# Patient Record
Sex: FEMALE | Race: WHITE | NOT HISPANIC OR LATINO | Employment: FULL TIME | URBAN - METROPOLITAN AREA
[De-identification: names, ages, dates, MRNs, and addresses within clinical notes are randomized per-mention and may not be internally consistent; named-entity substitution may affect disease eponyms.]

---

## 2018-11-14 ENCOUNTER — OFFICE VISIT (OUTPATIENT)
Dept: FAMILY MEDICINE | Facility: CLINIC | Age: 48
End: 2018-11-14
Payer: COMMERCIAL

## 2018-11-14 VITALS
HEART RATE: 77 BPM | DIASTOLIC BLOOD PRESSURE: 70 MMHG | SYSTOLIC BLOOD PRESSURE: 120 MMHG | WEIGHT: 185 LBS | RESPIRATION RATE: 16 BRPM | TEMPERATURE: 98.3 F | HEIGHT: 63 IN | OXYGEN SATURATION: 98 % | BODY MASS INDEX: 32.78 KG/M2

## 2018-11-14 DIAGNOSIS — H01.001 BLEPHARITIS OF UPPER EYELIDS OF BOTH EYES, UNSPECIFIED TYPE: ICD-10-CM

## 2018-11-14 DIAGNOSIS — Z00.00 ROUTINE PHYSICAL EXAMINATION: Primary | ICD-10-CM

## 2018-11-14 DIAGNOSIS — J32.0 CHRONIC MAXILLARY SINUSITIS: ICD-10-CM

## 2018-11-14 DIAGNOSIS — H01.004 BLEPHARITIS OF UPPER EYELIDS OF BOTH EYES, UNSPECIFIED TYPE: ICD-10-CM

## 2018-11-14 PROCEDURE — 99396 PREV VISIT EST AGE 40-64: CPT | Performed by: FAMILY MEDICINE

## 2018-11-14 RX ORDER — CETIRIZINE HYDROCHLORIDE 1 MG/ML
SOLUTION ORAL
COMMUNITY
End: 2018-11-14 | Stop reason: ENTERED-IN-ERROR

## 2018-11-14 RX ORDER — MOMETASONE FUROATE MONOHYDRATE 50 UG/1
SPRAY, METERED NASAL
COMMUNITY
Start: 2012-09-04 | End: 2018-11-14 | Stop reason: ENTERED-IN-ERROR

## 2018-11-14 RX ORDER — MULTIVITAMIN
1 TABLET ORAL DAILY
COMMUNITY
End: 2021-07-30

## 2018-11-14 RX ORDER — CETIRIZINE HYDROCHLORIDE 10 MG/1
10 TABLET ORAL AS NEEDED
COMMUNITY

## 2018-11-14 RX ORDER — MONTELUKAST SODIUM 10 MG/1
10 TABLET ORAL
COMMUNITY
Start: 2012-09-04 | End: 2018-11-14 | Stop reason: ENTERED-IN-ERROR

## 2018-11-14 RX ORDER — DOXYCYCLINE HYCLATE 100 MG
100 TABLET ORAL 2 TIMES DAILY
Qty: 20 TABLET | Refills: 0 | Status: SHIPPED | OUTPATIENT
Start: 2018-11-14 | End: 2018-11-24

## 2018-11-14 RX ORDER — ERYTHROMYCIN 5 MG/G
OINTMENT OPHTHALMIC NIGHTLY
Qty: 3.5 G | Refills: 0 | Status: SHIPPED | OUTPATIENT
Start: 2018-11-14 | End: 2018-11-28

## 2018-11-14 ASSESSMENT — ENCOUNTER SYMPTOMS
PALPITATIONS: 0
SINUS PRESSURE: 1
ARTHRALGIAS: 0
DIARRHEA: 0
NERVOUS/ANXIOUS: 0
HEMATURIA: 0
FATIGUE: 0
EYE PAIN: 0
SINUS COMPLAINT: 1
CHILLS: 0
CONSTIPATION: 0
COUGH: 0
FEVER: 0
CHEST TIGHTNESS: 0
DIZZINESS: 0
RHINORRHEA: 1
WHEEZING: 0
ABDOMINAL PAIN: 0
HEADACHES: 0
FREQUENCY: 0
SORE THROAT: 0
BRUISES/BLEEDS EASILY: 0
MYALGIAS: 0
SHORTNESS OF BREATH: 0
WEAKNESS: 0
SINUS PAIN: 1

## 2019-02-19 ENCOUNTER — TELEPHONE (OUTPATIENT)
Dept: FAMILY MEDICINE | Facility: CLINIC | Age: 49
End: 2019-02-19

## 2019-02-19 DIAGNOSIS — R31.9 HEMATURIA, UNSPECIFIED TYPE: Primary | ICD-10-CM

## 2019-02-19 LAB
ALBUMIN SERPL-MCNC: 4.3 G/DL (ref 3.5–5.5)
ALBUMIN/GLOB SERPL: 2.3 {RATIO} (ref 1.2–2.2)
ALP SERPL-CCNC: 51 IU/L (ref 39–117)
ALT SERPL-CCNC: 14 IU/L (ref 0–32)
APPEARANCE UR: CLEAR
AST SERPL-CCNC: 18 IU/L (ref 0–40)
BACTERIA #/AREA URNS HPF: ABNORMAL /[HPF]
BILIRUB SERPL-MCNC: 0.3 MG/DL (ref 0–1.2)
BILIRUB UR QL STRIP: NEGATIVE
BUN SERPL-MCNC: 11 MG/DL (ref 6–24)
BUN/CREAT SERPL: 15 (ref 9–23)
CALCIUM SERPL-MCNC: 8.9 MG/DL (ref 8.7–10.2)
CHLORIDE SERPL-SCNC: 105 MMOL/L (ref 96–106)
CHOLEST SERPL-MCNC: 163 MG/DL (ref 100–199)
CO2 SERPL-SCNC: 24 MMOL/L (ref 20–29)
COLOR UR: YELLOW
CREAT SERPL-MCNC: 0.72 MG/DL (ref 0.57–1)
EPI CELLS #/AREA URNS HPF: ABNORMAL /HPF
ERYTHROCYTE [DISTWIDTH] IN BLOOD BY AUTOMATED COUNT: 13.7 % (ref 12.3–15.4)
GLOBULIN SER CALC-MCNC: 1.9 G/DL (ref 1.5–4.5)
GLUCOSE SERPL-MCNC: 87 MG/DL (ref 65–99)
GLUCOSE UR QL: NEGATIVE
HCT VFR BLD AUTO: 39.2 % (ref 34–46.6)
HDLC SERPL-MCNC: 68 MG/DL
HGB BLD-MCNC: 12.6 G/DL (ref 11.1–15.9)
HGB UR QL STRIP: (no result)
KETONES UR QL STRIP: NEGATIVE
LAB CORP EGFR IF AFRICN AM: 115 ML/MIN/1.73
LAB CORP EGFR IF NONAFRICN AM: 99 ML/MIN/1.73
LAB CORP URINALYSIS REFLEX: (no result)
LDLC SERPL CALC-MCNC: 84 MG/DL (ref 0–99)
LEUKOCYTE ESTERASE UR QL STRIP: NEGATIVE
MCH RBC QN AUTO: 28.9 PG (ref 26.6–33)
MCHC RBC AUTO-ENTMCNC: 32.1 G/DL (ref 31.5–35.7)
MCV RBC AUTO: 90 FL (ref 79–97)
MICRO URNS: (no result)
MUCOUS THREADS URNS QL MICRO: PRESENT
NITRITE UR QL STRIP: NEGATIVE
PH UR STRIP: 7.5 [PH] (ref 5–7.5)
PLATELET # BLD AUTO: 325 X10E3/UL (ref 150–379)
POTASSIUM SERPL-SCNC: 4.5 MMOL/L (ref 3.5–5.2)
PROT SERPL-MCNC: 6.2 G/DL (ref 6–8.5)
PROT UR QL STRIP: NEGATIVE
RBC # BLD AUTO: 4.36 X10E6/UL (ref 3.77–5.28)
RBC #/AREA URNS HPF: >30 /HPF
SODIUM SERPL-SCNC: 141 MMOL/L (ref 134–144)
SP GR UR: 1.02 (ref 1–1.03)
TRIGL SERPL-MCNC: 53 MG/DL (ref 0–149)
TSH SERPL DL<=0.005 MIU/L-ACNC: 2.18 UIU/ML (ref 0.45–4.5)
UROBILINOGEN UR STRIP-MCNC: 1 EU/DL (ref 0.2–1)
VLDLC SERPL CALC-MCNC: 11 MG/DL (ref 5–40)
WBC # BLD AUTO: 6.4 X10E3/UL (ref 3.4–10.8)
WBC #/AREA URNS HPF: ABNORMAL /HPF

## 2019-02-21 ENCOUNTER — TELEPHONE (OUTPATIENT)
Dept: FAMILY MEDICINE | Facility: CLINIC | Age: 49
End: 2019-02-21

## 2019-02-21 NOTE — TELEPHONE ENCOUNTER
[facility] labs look good overall except urine showing blood and make sure she wasn't on her period and would like to repeat the urine in 4 wks to see if it clears up otherwise rest of labs ok for 1yr          ----- Message from Kenia Dumont, DO sent at 2/19/2019  9:42 PM EST -----  Please let her know labs look good overall except urine showing blood and make sure she wasn't on her period and would like to repeat the urine in 4 wks to see if it clears up otherwise rest of labs ok for 1yr

## 2019-03-18 NOTE — PROGRESS NOTES
Subjective      Patient ID: Karey Kaur is a 48 y.o. female.    She is here for Pe. Pt has been doing well overall. She is trying to follow well balanced meals. Stays well hydrated. She is walking 30min 5x wk. Average 6-7hrs of sleep a night. Normal BMs (usually is constipated) and urination. Normal menstrual cycles. UTD with gyn care. Due for eye exam. Due for fasting labs. UTD with immunizations. She saw derm last wk for eyelid swollen and puffy and tried elidel and tried hot compresses and was using baby shampoo and not getting better and has seen eye dr and didn't help her. Also having sinus issues for past few 4wks and not getitng better and yellow productive sputum as well along with ear pain.       Sinus Problem   Associated symptoms include ear pain and sinus pressure. Pertinent negatives include no chills, coughing, headaches, shortness of breath or sore throat.       The following have been reviewed and updated as appropriate in this visit:  Tobacco  Allergies  Meds  Problems  Med Hx  Surg Hx  Fam Hx       Review of Systems   Constitutional: Negative for chills, fatigue and fever.   HENT: Positive for ear pain, postnasal drip, rhinorrhea, sinus pain and sinus pressure. Negative for sore throat.    Eyes: Negative for pain and visual disturbance.        Upper eyelid swelling   Respiratory: Negative for cough, chest tightness, shortness of breath and wheezing.    Cardiovascular: Negative for chest pain and palpitations.   Gastrointestinal: Negative for abdominal pain, constipation and diarrhea.   Genitourinary: Negative for decreased urine volume, frequency and hematuria.   Musculoskeletal: Negative for arthralgias and myalgias.   Skin: Negative for rash.   Neurological: Negative for dizziness, weakness and headaches.   Hematological: Does not bruise/bleed easily.   Psychiatric/Behavioral: Negative for behavioral problems. The patient is not nervous/anxious.        Current Outpatient  "Prescriptions   Medication Sig Dispense Refill   • cetirizine (ZyrTEC) 10 mg tablet Take 10 mg by mouth daily.     • multivitamin (multivitamin) tablet Take 1 tablet by mouth daily.     • doxycycline (VIBRA-TABS) 100 mg tablet Take 1 tablet (100 mg total) by mouth 2 (two) times a day for 10 days. 20 tablet 0   • erythromycin (ILOTYCIN) 5 mg/gram (0.5 %) ophthalmic ointment Apply to both eyes nightly for 14 days. 3.5 g 0     No current facility-administered medications for this visit.      History reviewed. No pertinent past medical history.  Family History   Problem Relation Age of Onset   • Diabetes Mother    • Hypertension Mother    • Heart disease Father    • Hyperlipidemia Father    • Hypertension Father    • Breast cancer Maternal Grandmother      Past Surgical History:   Procedure Laterality Date   • ANKLE SURGERY     •  SECTION     • CHOLECYSTECTOMY     • LASIK     • LITHOTRIPSY     • MYRINGOTOMY W/ TUBES     • TONSILLECTOMY     • TUBAL LIGATION       Social History     Social History   • Marital status:      Spouse name: N/A   • Number of children: N/A   • Years of education: N/A     Occupational History   • Not on file.     Social History Main Topics   • Smoking status: Never Smoker   • Smokeless tobacco: Never Used   • Alcohol use Yes      Comment: Social   • Drug use: No   • Sexual activity: Not on file     Other Topics Concern   • Not on file     Social History Narrative   • No narrative on file     Allergies   Allergen Reactions   • Erythromycin GI intolerance   • Levofloxacin Other (see comments)   • Adhesive Rash   • Latex Rash   • Penicillins Rash   • Sulfa (Sulfonamide Antibiotics) Rash       Objective   /70 (BP Location: Right upper arm, Patient Position: Sitting)   Pulse 77   Temp 36.8 °C (98.3 °F) (Oral)   Resp 16   Ht 1.6 m (5' 2.99\")   Wt 83.9 kg (185 lb)   LMP 2018   SpO2 98%   BMI 32.78 kg/m²   Physical Exam   Constitutional: She is oriented to person, " place, and time. She appears well-developed and well-nourished.   HENT:   Head: Normocephalic and atraumatic.   Right Ear: External ear normal.   Left Ear: External ear normal.   B/L swollen turbinates, deviated septum, mild tender to maxillary sinuses, upper eyelids no swelling or discharge   Eyes: Conjunctivae are normal. Pupils are equal, round, and reactive to light.   Neck: Normal range of motion. Neck supple. No thyromegaly present.   Cardiovascular: Normal rate, regular rhythm and normal heart sounds.    No murmur heard.  Pulmonary/Chest: Effort normal and breath sounds normal. She has no wheezes.   Abdominal: Soft. Bowel sounds are normal. There is no tenderness.   Musculoskeletal: Normal range of motion. She exhibits no tenderness.   Lymphadenopathy:     She has no cervical adenopathy.   Neurological: She is alert and oriented to person, place, and time. No cranial nerve deficit.   Skin: Skin is warm and dry. No rash noted.   Psychiatric: She has a normal mood and affect.   Nursing note and vitals reviewed.      Assessment/Plan   Problem List Items Addressed This Visit     None      Visit Diagnoses     Routine physical examination    -  Primary    Relevant Orders    Comprehensive metabolic panel    TSH 3rd Generation    CBC    Lipid panel    Urinalysis with Reflex Culture    Chronic maxillary sinusitis        Given over 4wks and not getitng better will cover with abx and cont her supportive care.     Relevant Medications    doxycycline (VIBRA-TABS) 100 mg tablet    Blepharitis of upper eyelids of both eyes, unspecified type        We will use hot compresses along with cont baby shampoo and will try topical abx and if no better needs to go to eye dr.     Relevant Medications    erythromycin (ILOTYCIN) 5 mg/gram (0.5 %) ophthalmic ointment      Pe- Pt doing well overall. Discussed diet and exercise. UTD with eye and dental exam. Will check fasting labs. UTD with immunizations.     Kenia Dumont,  DO  11/14/2018   No

## 2021-02-01 ENCOUNTER — TRANSCRIBE ORDERS (OUTPATIENT)
Dept: SCHEDULING | Age: 51
End: 2021-02-01

## 2021-02-01 DIAGNOSIS — R07.2 PRECORDIAL PAIN: Primary | ICD-10-CM

## 2021-02-01 DIAGNOSIS — R00.2 PALPITATIONS: ICD-10-CM

## 2021-02-04 ENCOUNTER — HOSPITAL ENCOUNTER (OUTPATIENT)
Dept: CARDIOLOGY | Facility: HOSPITAL | Age: 51
Discharge: HOME | End: 2021-02-04
Attending: INTERNAL MEDICINE
Payer: COMMERCIAL

## 2021-02-04 VITALS — DIASTOLIC BLOOD PRESSURE: 70 MMHG | HEART RATE: 92 BPM | SYSTOLIC BLOOD PRESSURE: 130 MMHG

## 2021-02-04 DIAGNOSIS — R00.2 PALPITATIONS: ICD-10-CM

## 2021-02-04 DIAGNOSIS — R07.2 PRECORDIAL PAIN: ICD-10-CM

## 2021-02-04 LAB
AORTIC ROOT ANNULUS: 2.8 CM
ASCENDING AORTA: 2.7 CM
FRACTIONAL SHORTENING: 34.6 %
INTERVENTRICULAR SEPTUM: 0.79 CM
LA/AORTA RATIO: 1.29
LAD 2D: 3.6 CM
LEFT INTERNAL DIMENSION IN SYSTOLE: 3.06 CM
LEFT VENTRICULAR INTERNAL DIMENSION IN DIASTOLE: 4.68 CM
LEFT VENTRICULAR POSTERIOR WALL IN END DIASTOLE: 0.93 CM
LVOT 2D: 2.1 CM
LVOT A: 3.46 CM2
POSTERIOR WALL: 0.93 CM
STRESS BASELINE BP: NORMAL MMHG
STRESS BASELINE HR: 86 BPM
STRESS PERCENT HR: 95 %
STRESS POST ESTIMATED WORKLOAD: 10.4 METS
STRESS POST EXERCISE DUR MIN: 9 MIN
STRESS POST EXERCISE DUR SEC: 31 SEC
STRESS POST PEAK BP: NORMAL MMHG
STRESS POST PEAK HR: 162 BPM
STRESS TARGET HR: 145 BPM
TR MAX PG: 18 MMHG
TRICUSPID VALVE PEAK REGURGITATION VELOCITY: 2.14 M/S
TV REST PULMONARY ARTERY PRESSURE: 22 MMHG

## 2021-02-04 PROCEDURE — 93351 STRESS TTE COMPLETE: CPT

## 2021-07-30 ENCOUNTER — OFFICE VISIT (OUTPATIENT)
Dept: PRIMARY CARE | Facility: CLINIC | Age: 51
End: 2021-07-30
Payer: COMMERCIAL

## 2021-07-30 VITALS
WEIGHT: 178.6 LBS | HEIGHT: 63 IN | TEMPERATURE: 98 F | SYSTOLIC BLOOD PRESSURE: 130 MMHG | OXYGEN SATURATION: 99 % | DIASTOLIC BLOOD PRESSURE: 84 MMHG | RESPIRATION RATE: 16 BRPM | HEART RATE: 77 BPM | BODY MASS INDEX: 31.64 KG/M2

## 2021-07-30 DIAGNOSIS — Z00.00 ROUTINE PHYSICAL EXAMINATION: Primary | ICD-10-CM

## 2021-07-30 DIAGNOSIS — J30.9 ALLERGIC RHINITIS, UNSPECIFIED SEASONALITY, UNSPECIFIED TRIGGER: ICD-10-CM

## 2021-07-30 PROCEDURE — 99396 PREV VISIT EST AGE 40-64: CPT | Performed by: FAMILY MEDICINE

## 2021-07-30 PROCEDURE — 3008F BODY MASS INDEX DOCD: CPT | Performed by: FAMILY MEDICINE

## 2021-07-30 RX ORDER — MECOBALAMIN 1000 MCG
1 TABLET,CHEWABLE ORAL DAILY
COMMUNITY
Start: 2021-03-07 | End: 2023-03-22 | Stop reason: ALTCHOICE

## 2021-07-30 RX ORDER — VIT C/E/ZN/COPPR/LUTEIN/ZEAXAN 250MG-90MG
1000 CAPSULE ORAL DAILY
COMMUNITY
Start: 2021-03-07 | End: 2023-03-22 | Stop reason: ALTCHOICE

## 2021-07-30 RX ORDER — BIOTIN 5 MG
1 TABLET ORAL DAILY
COMMUNITY
Start: 2021-03-07 | End: 2023-03-22 | Stop reason: ALTCHOICE

## 2021-07-30 ASSESSMENT — ENCOUNTER SYMPTOMS
HEADACHES: 1
MYALGIAS: 0
CONSTIPATION: 0
RHINORRHEA: 1
FATIGUE: 0
DIZZINESS: 0
HEMATURIA: 0
DYSURIA: 0
FEVER: 0
NERVOUS/ANXIOUS: 0
COUGH: 0
WHEEZING: 0
ARTHRALGIAS: 0
CHILLS: 0
PALPITATIONS: 0
SHORTNESS OF BREATH: 0
SORE THROAT: 0
SLEEP DISTURBANCE: 0
FREQUENCY: 0
ABDOMINAL PAIN: 0
BLOOD IN STOOL: 0

## 2021-07-30 NOTE — PROGRESS NOTES
Subjective      Patient ID: Karey Kaur is a 51 y.o. female.    She is here for physical. Pt has been doing well overall. She is trying to follow well balanced meals. Stays well hydrated. She is not as active as she should be but planning to do cardio 3x wk. She does with L knee pain/R ankle so that has limited her ability. Average 7hrs of sleep a night. Normal BMs and urination. Due for colonoscopy. UTD with eye and dental exam. UTD with gyn care/mammo. Due for fasting labs. UTD with immunizations. She is starting with irregular menstrual cycles. She feels since COVID vaccine she has had sinus congestion and some headaches and can have yellow nasal discharge.       The following have been reviewed and updated as appropriate in this visit:  Tobacco  Allergies  Meds  Problems  Med Hx  Surg Hx  Fam Hx       Review of Systems   Constitutional: Negative for chills, fatigue and fever.   HENT: Positive for congestion and rhinorrhea. Negative for ear discharge, ear pain, postnasal drip and sore throat.    Respiratory: Negative for cough, shortness of breath and wheezing.    Cardiovascular: Negative for chest pain and palpitations.   Gastrointestinal: Negative for abdominal pain, blood in stool and constipation.   Genitourinary: Negative for dysuria, frequency and hematuria.   Musculoskeletal: Negative for arthralgias and myalgias.   Skin: Negative for rash.   Neurological: Positive for headaches. Negative for dizziness.   Psychiatric/Behavioral: Negative for sleep disturbance and suicidal ideas. The patient is not nervous/anxious.        Current Outpatient Medications   Medication Sig Dispense Refill   • ascorbic acid/zinc (ZINC SULFATE-VITAMIN C ORAL) Take 1 tablet by mouth daily.     • biotin 5 mg tablet Take 1 tablet by mouth daily.     • cetirizine (ZyrTEC) 10 mg tablet Take 10 mg by mouth daily.     • cholecalciferol, vitamin D3, (VITAMIN D3) 25 mcg (1,000 unit) capsule Take 1,000 Units by mouth daily.      • mecobalamin, vitamin B12, (B12 ACTIVE) 1,000 mcg tablet,chewable Take 1 tablet by mouth daily.       No current facility-administered medications for this visit.     History reviewed. No pertinent past medical history.  Family History   Problem Relation Age of Onset   • Diabetes Biological Mother    • Hypertension Biological Mother    • Hyperlipidemia Biological Father    • Hypertension Biological Father    • Breast cancer Maternal Grandmother    • Aneurysm Mother's Brother      Past Surgical History:   Procedure Laterality Date   • ANKLE SURGERY     •  SECTION     • CHOLECYSTECTOMY     • LASIK     • LITHOTRIPSY     • MYRINGOTOMY W/ TUBES     • TONSILLECTOMY     • TUBAL LIGATION       Social History     Socioeconomic History   • Marital status:      Spouse name: Not on file   • Number of children: Not on file   • Years of education: Not on file   • Highest education level: Not on file   Occupational History   • Not on file   Tobacco Use   • Smoking status: Never Smoker   • Smokeless tobacco: Never Used   Substance and Sexual Activity   • Alcohol use: Yes     Comment: Social   • Drug use: No   • Sexual activity: Not on file   Other Topics Concern   • Not on file   Social History Narrative   • Not on file     Social Determinants of Health     Financial Resource Strain:    • Difficulty of Paying Living Expenses:    Food Insecurity:    • Worried About Running Out of Food in the Last Year:    • Ran Out of Food in the Last Year:    Transportation Needs:    • Lack of Transportation (Medical):    • Lack of Transportation (Non-Medical):    Physical Activity:    • Days of Exercise per Week:    • Minutes of Exercise per Session:    Stress:    • Feeling of Stress :    Social Connections:    • Frequency of Communication with Friends and Family:    • Frequency of Social Gatherings with Friends and Family:    • Attends [employer] Services:    • Active Member of Clubs or Organizations:    • Attends Club or  "Organization Meetings:    • Marital Status:    Intimate Partner Violence:    • Fear of Current or Ex-Partner:    • Emotionally Abused:    • Physically Abused:    • Sexually Abused:      Allergies   Allergen Reactions   • Nut - Unspecified GI intolerance   • Cinnamon    • Erythromycin GI intolerance   • Lavender (Lavandula Angustifolia)    • Levofloxacin Other (see comments)   • Adhesive Rash   • Latex Rash   • Penicillins Rash   • Sulfa (Sulfonamide Antibiotics) Rash       Objective   Visit Vitals  /84 (BP Location: Right upper arm, Patient Position: Sitting)   Pulse 77   Temp 36.7 °C (98 °F) (Temporal)   Resp 16   Ht 1.6 m (5' 3\")   Wt 81 kg (178 lb 9.6 oz)   LMP 07/18/2021   SpO2 99%   BMI 31.64 kg/m²     Physical Exam  Vitals and nursing note reviewed.   Constitutional:       Appearance: Normal appearance.   HENT:      Right Ear: Tympanic membrane normal. There is no impacted cerumen.      Left Ear: Tympanic membrane normal. There is no impacted cerumen.      Nose: Nose normal. No rhinorrhea.      Mouth/Throat:      Mouth: Mucous membranes are moist.      Pharynx: Oropharynx is clear. No posterior oropharyngeal erythema.   Cardiovascular:      Rate and Rhythm: Normal rate and regular rhythm.      Heart sounds: No murmur heard.     Pulmonary:      Effort: Pulmonary effort is normal.      Breath sounds: Normal breath sounds. No wheezing.   Abdominal:      General: Bowel sounds are normal.      Palpations: Abdomen is soft.      Tenderness: There is no abdominal tenderness.   Musculoskeletal:         General: Normal range of motion.      Cervical back: Normal range of motion and neck supple.   Skin:     General: Skin is warm.      Findings: No rash.   Neurological:      General: No focal deficit present.      Mental Status: She is alert and oriented to person, place, and time.      Cranial Nerves: No cranial nerve deficit.   Psychiatric:         Mood and Affect: Mood normal.         Behavior: Behavior normal. "         Assessment/Plan   Problem List Items Addressed This Visit        Respiratory    Allergic rhinitis      Other Visit Diagnoses     Routine physical examination    -  Primary    Relevant Orders    Comprehensive metabolic panel    TSH    CBC    Lipid panel      She is doing ok overall. We discussed diet and exercise. Will check updated fasting labs. She will set up colonoscopy soon. UTD with immunizations. UTD with gyn care/mammo. She will see ortho for her joint pains given she had surgery and has plates in place. She will also try zyrtec every day and flonase for next few wks and if no better she will let me know and have her see ENT.     Kenia Dumont, DO  7/30/2021

## 2021-09-28 ENCOUNTER — OFFICE VISIT (OUTPATIENT)
Dept: PRIMARY CARE | Facility: CLINIC | Age: 51
End: 2021-09-28
Payer: COMMERCIAL

## 2021-09-28 VITALS
BODY MASS INDEX: 32.36 KG/M2 | OXYGEN SATURATION: 96 % | SYSTOLIC BLOOD PRESSURE: 122 MMHG | RESPIRATION RATE: 16 BRPM | TEMPERATURE: 98 F | HEART RATE: 70 BPM | DIASTOLIC BLOOD PRESSURE: 80 MMHG | HEIGHT: 63 IN | WEIGHT: 182.6 LBS

## 2021-09-28 DIAGNOSIS — R10.13 EPIGASTRIC PAIN: Primary | ICD-10-CM

## 2021-09-28 DIAGNOSIS — R30.0 DYSURIA: ICD-10-CM

## 2021-09-28 PROCEDURE — 3008F BODY MASS INDEX DOCD: CPT | Performed by: NURSE PRACTITIONER

## 2021-09-28 PROCEDURE — 99213 OFFICE O/P EST LOW 20 MIN: CPT | Performed by: NURSE PRACTITIONER

## 2021-09-28 RX ORDER — FAMOTIDINE 20 MG/1
20 TABLET, FILM COATED ORAL 2 TIMES DAILY
Qty: 14 TABLET | Refills: 0 | Status: SHIPPED | OUTPATIENT
Start: 2021-09-28 | End: 2022-09-14 | Stop reason: ALTCHOICE

## 2021-09-28 ASSESSMENT — ENCOUNTER SYMPTOMS
DIAPHORESIS: 0
MYALGIAS: 0
FATIGUE: 0
CHEST TIGHTNESS: 0
WOUND: 0
PALPITATIONS: 0
SINUS PAIN: 0
HEMATOLOGIC/LYMPHATIC NEGATIVE: 1
EYES NEGATIVE: 1
FEVER: 0
CHILLS: 0
COUGH: 0
ABDOMINAL PAIN: 1
SORE THROAT: 0
AGITATION: 0
DYSURIA: 1
ALLERGIC/IMMUNOLOGIC NEGATIVE: 1
CONFUSION: 0
NAUSEA: 0
SINUS PRESSURE: 0
SHORTNESS OF BREATH: 0
ENDOCRINE NEGATIVE: 1
DIARRHEA: 0
NEUROLOGICAL NEGATIVE: 1

## 2021-09-28 ASSESSMENT — PATIENT HEALTH QUESTIONNAIRE - PHQ9: SUM OF ALL RESPONSES TO PHQ9 QUESTIONS 1 & 2: 0

## 2021-09-28 NOTE — PATIENT INSTRUCTIONS
Please take Pepcid 2x per day for the next one week.  We have sent your urine out for analysis.      Thank you for choosing [facility].

## 2021-09-29 LAB
APPEARANCE UR: CLEAR
BACTERIA #/AREA URNS HPF: NORMAL /[HPF]
BILIRUB UR QL STRIP: NEGATIVE
CASTS URNS QL MICRO: NORMAL /LPF
COLOR UR: YELLOW
EPI CELLS #/AREA URNS HPF: NORMAL /HPF (ref 0–10)
GLUCOSE UR QL: NEGATIVE
HGB UR QL STRIP: NEGATIVE
KETONES UR QL STRIP: NEGATIVE
LEUKOCYTE ESTERASE UR QL STRIP: NEGATIVE
MICRO URNS: NORMAL
MICRO URNS: NORMAL
NITRITE UR QL STRIP: NEGATIVE
PH UR STRIP: 6.5 [PH] (ref 5–7.5)
PROT UR QL STRIP: NEGATIVE
RBC #/AREA URNS HPF: NORMAL /HPF (ref 0–2)
SP GR UR: 1.01 (ref 1–1.03)
UROBILINOGEN UR STRIP-MCNC: 0.2 MG/DL (ref 0.2–1)
WBC #/AREA URNS HPF: NORMAL /HPF (ref 0–5)

## 2021-09-30 LAB
BACTERIA UR CULT: NO GROWTH
BACTERIA UR CULT: NORMAL

## 2021-10-04 ENCOUNTER — TELEPHONE (OUTPATIENT)
Dept: PRIMARY CARE | Facility: CLINIC | Age: 51
End: 2021-10-04

## 2021-10-15 LAB
ALBUMIN SERPL-MCNC: 4.5 G/DL (ref 3.8–4.9)
ALBUMIN/GLOB SERPL: 1.9 {RATIO} (ref 1.2–2.2)
ALP SERPL-CCNC: 58 IU/L (ref 44–121)
ALT SERPL-CCNC: 11 IU/L (ref 0–32)
AST SERPL-CCNC: 18 IU/L (ref 0–40)
BILIRUB SERPL-MCNC: 0.3 MG/DL (ref 0–1.2)
BUN SERPL-MCNC: 16 MG/DL (ref 6–24)
BUN/CREAT SERPL: 22 (ref 9–23)
CALCIUM SERPL-MCNC: 9.6 MG/DL (ref 8.7–10.2)
CHLORIDE SERPL-SCNC: 102 MMOL/L (ref 96–106)
CHOLEST SERPL-MCNC: 186 MG/DL (ref 100–199)
CO2 SERPL-SCNC: 23 MMOL/L (ref 20–29)
CREAT SERPL-MCNC: 0.72 MG/DL (ref 0.57–1)
ERYTHROCYTE [DISTWIDTH] IN BLOOD BY AUTOMATED COUNT: 13.6 % (ref 11.7–15.4)
GLOBULIN SER CALC-MCNC: 2.4 G/DL (ref 1.5–4.5)
GLUCOSE SERPL-MCNC: 85 MG/DL (ref 65–99)
HCT VFR BLD AUTO: 40.3 % (ref 34–46.6)
HDLC SERPL-MCNC: 78 MG/DL
HGB BLD-MCNC: 13.4 G/DL (ref 11.1–15.9)
LAB CORP EGFR IF AFRICN AM: 112 ML/MIN/1.73
LAB CORP EGFR IF NONAFRICN AM: 97 ML/MIN/1.73
LDLC SERPL CALC-MCNC: 98 MG/DL (ref 0–99)
MCH RBC QN AUTO: 29.3 PG (ref 26.6–33)
MCHC RBC AUTO-ENTMCNC: 33.3 G/DL (ref 31.5–35.7)
MCV RBC AUTO: 88 FL (ref 79–97)
PLATELET # BLD AUTO: 346 X10E3/UL (ref 150–450)
POTASSIUM SERPL-SCNC: 4.7 MMOL/L (ref 3.5–5.2)
PROT SERPL-MCNC: 6.9 G/DL (ref 6–8.5)
RBC # BLD AUTO: 4.57 X10E6/UL (ref 3.77–5.28)
SODIUM SERPL-SCNC: 139 MMOL/L (ref 134–144)
TRIGL SERPL-MCNC: 50 MG/DL (ref 0–149)
TSH SERPL DL<=0.005 MIU/L-ACNC: 2.43 UIU/ML (ref 0.45–4.5)
VLDLC SERPL CALC-MCNC: 10 MG/DL (ref 5–40)
WBC # BLD AUTO: 8 X10E3/UL (ref 3.4–10.8)

## 2021-11-30 ENCOUNTER — TRANSCRIBE ORDERS (OUTPATIENT)
Dept: SCHEDULING | Age: 51
End: 2021-11-30
Payer: COMMERCIAL

## 2021-11-30 DIAGNOSIS — Z12.31 ENCOUNTER FOR SCREENING MAMMOGRAM FOR MALIGNANT NEOPLASM OF BREAST: Primary | ICD-10-CM

## 2021-12-14 ENCOUNTER — HOSPITAL ENCOUNTER (OUTPATIENT)
Dept: RADIOLOGY | Facility: CLINIC | Age: 51
Discharge: HOME | End: 2021-12-14
Attending: OBSTETRICS & GYNECOLOGY
Payer: COMMERCIAL

## 2021-12-14 DIAGNOSIS — Z12.31 ENCOUNTER FOR SCREENING MAMMOGRAM FOR MALIGNANT NEOPLASM OF BREAST: ICD-10-CM

## 2021-12-14 PROCEDURE — 77067 SCR MAMMO BI INCL CAD: CPT

## 2022-06-13 ENCOUNTER — TELEPHONE (OUTPATIENT)
Dept: PRIMARY CARE | Facility: CLINIC | Age: 52
End: 2022-06-13

## 2022-06-13 NOTE — TELEPHONE ENCOUNTER
Diarrhea for 17 days was evaluated by [facility] diarrhea has stopped but still having abdominal pain after eating requesting assistance with getting a visit scheduled asap with Gastroenterology     Callback #[phone]

## 2022-06-15 NOTE — TELEPHONE ENCOUNTER
Spoke with Karey, she reports that 4 weeks ago she went to [facility] in [facility] cause she was dealing with diarrhea. It went on for 2/12 weeks straight and then started to improve. She would have a normal bowel movement and then it would return again. Then would return for another week. She has had solid bowel movements for the last week and a half. She is now dealing with very bad abdominal pain after eating. Its not when she eats specific foods, its all the time. It tends to start 1/2-45 mins later after eating. Explained to Karey that, I would try and reach out to [facility] to schedule an appointment.      Call [facility] and then next available appointment wouldn't be until September/October. Requested information and demographics to be faxed over. They will reach out to patient to schedule an appointment.     Called Karey, relayed this information over to her. Advised her that I would try [facility] to see if they have anything sooner. Will keep her updated with everything.

## 2022-06-17 ENCOUNTER — HOSPITAL ENCOUNTER (EMERGENCY)
Facility: HOSPITAL | Age: 52
Discharge: HOME | End: 2022-06-18
Attending: EMERGENCY MEDICINE | Admitting: EMERGENCY MEDICINE
Payer: COMMERCIAL

## 2022-06-17 ENCOUNTER — APPOINTMENT (EMERGENCY)
Dept: RADIOLOGY | Facility: HOSPITAL | Age: 52
End: 2022-06-17
Payer: COMMERCIAL

## 2022-06-17 DIAGNOSIS — R07.9 CHEST PAIN: Primary | ICD-10-CM

## 2022-06-17 DIAGNOSIS — R10.9 ABDOMINAL PAIN: ICD-10-CM

## 2022-06-17 LAB
ALBUMIN SERPL BCP-MCNC: 4.2 G/DL (ref 3.5–5)
ALP SERPL-CCNC: 50 U/L (ref 34–104)
ALT SERPL W P-5'-P-CCNC: 11 U/L (ref 7–52)
ANION GAP SERPL CALCULATED.3IONS-SCNC: 7 MMOL/L (ref 4–13)
APTT PPP: 30 SECONDS (ref 23–37)
AST SERPL W P-5'-P-CCNC: 16 U/L (ref 13–39)
BASOPHILS # BLD AUTO: 0.04 THOUSANDS/ΜL (ref 0–0.1)
BASOPHILS NFR BLD AUTO: 0 % (ref 0–1)
BILIRUB SERPL-MCNC: 0.33 MG/DL (ref 0.2–1)
BILIRUB UR QL STRIP: NEGATIVE
BUN SERPL-MCNC: 15 MG/DL (ref 5–25)
CALCIUM SERPL-MCNC: 9.3 MG/DL (ref 8.4–10.2)
CHLORIDE SERPL-SCNC: 102 MMOL/L (ref 96–108)
CLARITY UR: CLEAR
CO2 SERPL-SCNC: 28 MMOL/L (ref 21–32)
COLOR UR: YELLOW
CREAT SERPL-MCNC: 0.73 MG/DL (ref 0.6–1.3)
EOSINOPHIL # BLD AUTO: 0.18 THOUSAND/ΜL (ref 0–0.61)
EOSINOPHIL NFR BLD AUTO: 2 % (ref 0–6)
ERYTHROCYTE [DISTWIDTH] IN BLOOD BY AUTOMATED COUNT: 13.1 % (ref 11.6–15.1)
GFR SERPL CREATININE-BSD FRML MDRD: 95 ML/MIN/1.73SQ M
GLUCOSE SERPL-MCNC: 95 MG/DL (ref 65–140)
GLUCOSE UR STRIP-MCNC: NEGATIVE MG/DL
HCT VFR BLD AUTO: 40.6 % (ref 34.8–46.1)
HGB BLD-MCNC: 12.9 G/DL (ref 11.5–15.4)
HGB UR QL STRIP.AUTO: NEGATIVE
IMM GRANULOCYTES # BLD AUTO: 0.02 THOUSAND/UL (ref 0–0.2)
IMM GRANULOCYTES NFR BLD AUTO: 0 % (ref 0–2)
INR PPP: 0.86 (ref 0.84–1.19)
KETONES UR STRIP-MCNC: NEGATIVE MG/DL
LEUKOCYTE ESTERASE UR QL STRIP: ABNORMAL
LIPASE SERPL-CCNC: 22 U/L (ref 11–82)
LYMPHOCYTES # BLD AUTO: 2.82 THOUSANDS/ΜL (ref 0.6–4.47)
LYMPHOCYTES NFR BLD AUTO: 30 % (ref 14–44)
MAGNESIUM SERPL-MCNC: 2 MG/DL (ref 1.9–2.7)
MCH RBC QN AUTO: 31.1 PG (ref 26.8–34.3)
MCHC RBC AUTO-ENTMCNC: 31.8 G/DL (ref 31.4–37.4)
MCV RBC AUTO: 98 FL (ref 82–98)
MONOCYTES # BLD AUTO: 0.64 THOUSAND/ΜL (ref 0.17–1.22)
MONOCYTES NFR BLD AUTO: 7 % (ref 4–12)
NEUTROPHILS # BLD AUTO: 5.59 THOUSANDS/ΜL (ref 1.85–7.62)
NEUTS SEG NFR BLD AUTO: 61 % (ref 43–75)
NITRITE UR QL STRIP: NEGATIVE
NRBC BLD AUTO-RTO: 0 /100 WBCS
PH UR STRIP.AUTO: 6 [PH]
PLATELET # BLD AUTO: 291 THOUSANDS/UL (ref 149–390)
PMV BLD AUTO: 9.8 FL (ref 8.9–12.7)
POTASSIUM SERPL-SCNC: 3.7 MMOL/L (ref 3.5–5.3)
PROT SERPL-MCNC: 6.5 G/DL (ref 6.4–8.4)
PROT UR STRIP-MCNC: NEGATIVE MG/DL
PROTHROMBIN TIME: 11.7 SECONDS (ref 11.6–14.5)
RBC # BLD AUTO: 4.15 MILLION/UL (ref 3.81–5.12)
SODIUM SERPL-SCNC: 137 MMOL/L (ref 135–147)
SP GR UR STRIP.AUTO: 1.02 (ref 1–1.03)
UROBILINOGEN UR QL STRIP.AUTO: 0.2 E.U./DL
WBC # BLD AUTO: 9.29 THOUSAND/UL (ref 4.31–10.16)

## 2022-06-17 PROCEDURE — 83735 ASSAY OF MAGNESIUM: CPT | Performed by: EMERGENCY MEDICINE

## 2022-06-17 PROCEDURE — 84484 ASSAY OF TROPONIN QUANT: CPT | Performed by: EMERGENCY MEDICINE

## 2022-06-17 PROCEDURE — 80053 COMPREHEN METABOLIC PANEL: CPT | Performed by: EMERGENCY MEDICINE

## 2022-06-17 PROCEDURE — 99285 EMERGENCY DEPT VISIT HI MDM: CPT

## 2022-06-17 PROCEDURE — 71045 X-RAY EXAM CHEST 1 VIEW: CPT

## 2022-06-17 PROCEDURE — 81001 URINALYSIS AUTO W/SCOPE: CPT | Performed by: EMERGENCY MEDICINE

## 2022-06-17 PROCEDURE — 96374 THER/PROPH/DIAG INJ IV PUSH: CPT

## 2022-06-17 PROCEDURE — 99285 EMERGENCY DEPT VISIT HI MDM: CPT | Performed by: EMERGENCY MEDICINE

## 2022-06-17 PROCEDURE — 85025 COMPLETE CBC W/AUTO DIFF WBC: CPT | Performed by: EMERGENCY MEDICINE

## 2022-06-17 PROCEDURE — 93005 ELECTROCARDIOGRAM TRACING: CPT

## 2022-06-17 PROCEDURE — 36415 COLL VENOUS BLD VENIPUNCTURE: CPT | Performed by: EMERGENCY MEDICINE

## 2022-06-17 PROCEDURE — 85610 PROTHROMBIN TIME: CPT | Performed by: EMERGENCY MEDICINE

## 2022-06-17 PROCEDURE — 85379 FIBRIN DEGRADATION QUANT: CPT | Performed by: EMERGENCY MEDICINE

## 2022-06-17 PROCEDURE — 85730 THROMBOPLASTIN TIME PARTIAL: CPT | Performed by: EMERGENCY MEDICINE

## 2022-06-17 PROCEDURE — 83690 ASSAY OF LIPASE: CPT | Performed by: EMERGENCY MEDICINE

## 2022-06-17 RX ORDER — HYDROMORPHONE HCL/PF 1 MG/ML
0.5 SYRINGE (ML) INJECTION ONCE
Status: COMPLETED | OUTPATIENT
Start: 2022-06-17 | End: 2022-06-17

## 2022-06-17 RX ADMIN — HYDROMORPHONE HYDROCHLORIDE 0.5 MG: 1 INJECTION, SOLUTION INTRAMUSCULAR; INTRAVENOUS; SUBCUTANEOUS at 23:37

## 2022-06-18 ENCOUNTER — APPOINTMENT (EMERGENCY)
Dept: CT IMAGING | Facility: HOSPITAL | Age: 52
End: 2022-06-18
Payer: COMMERCIAL

## 2022-06-18 VITALS
OXYGEN SATURATION: 97 % | TEMPERATURE: 97.6 F | DIASTOLIC BLOOD PRESSURE: 56 MMHG | SYSTOLIC BLOOD PRESSURE: 113 MMHG | HEART RATE: 70 BPM | HEIGHT: 63 IN | RESPIRATION RATE: 18 BRPM | BODY MASS INDEX: 27.11 KG/M2 | WEIGHT: 153 LBS

## 2022-06-18 LAB
BACTERIA UR QL AUTO: ABNORMAL /HPF
CARDIAC TROPONIN I PNL SERPL HS: 2 NG/L
D DIMER PPP FEU-MCNC: <0.27 UG/ML FEU
NON-SQ EPI CELLS URNS QL MICRO: ABNORMAL /HPF
RBC #/AREA URNS AUTO: ABNORMAL /HPF
WBC #/AREA URNS AUTO: ABNORMAL /HPF

## 2022-06-18 PROCEDURE — G1004 CDSM NDSC: HCPCS

## 2022-06-18 PROCEDURE — 74176 CT ABD & PELVIS W/O CONTRAST: CPT

## 2022-06-18 RX ORDER — DICYCLOMINE HCL 20 MG
20 TABLET ORAL EVERY 8 HOURS PRN
Qty: 10 TABLET | Refills: 0 | Status: SHIPPED | OUTPATIENT
Start: 2022-06-18

## 2022-06-18 RX ORDER — DICYCLOMINE HCL 20 MG
20 TABLET ORAL ONCE
Status: COMPLETED | OUTPATIENT
Start: 2022-06-18 | End: 2022-06-18

## 2022-06-18 RX ADMIN — DICYCLOMINE HYDROCHLORIDE 20 MG: 20 TABLET ORAL at 01:11

## 2022-06-18 NOTE — DISCHARGE INSTRUCTIONS
Follow up with a GI doctor near you or your PCP  Return if symptoms worsen or if new symptoms develop 83.7

## 2022-06-18 NOTE — ED PROVIDER NOTES
History  Chief Complaint   Patient presents with    Chest Pain     Radiates to right shoulder      49-year-old female presenting with left-sided abdominal pain rating left chest and left arm  Symptoms started at 6:30 p m  Pilo Adams Patient states she has had similar episodes in the past however has been intermittent  She denies pleuritic pain  Pain is slightly improved when she leans forward  History distant cholecystectomy  No recent significant travel or hospitalizations  No history of PE or DVTs  Denies prior cardiac history  Chest Pain  Pain location:  Substernal area  Pain quality: sharp    Pain radiates to:  Does not radiate  Pain severity:  Moderate  Onset quality:  Sudden  Timing:  Constant  Progression:  Unchanged  Chronicity:  New  Associated symptoms: abdominal pain    Associated symptoms: no back pain, no cough, no fever, no nausea, no numbness, not vomiting and no weakness        None       No past medical history on file  No past surgical history on file  No family history on file  I have reviewed and agree with the history as documented  E-Cigarette/Vaping     E-Cigarette/Vaping Substances          Review of Systems   Constitutional: Negative for chills and fever  HENT: Negative for congestion, nosebleeds, rhinorrhea and sore throat  Eyes: Negative for pain and visual disturbance  Respiratory: Negative for cough and wheezing  Cardiovascular: Positive for chest pain  Negative for leg swelling  Gastrointestinal: Positive for abdominal pain  Negative for abdominal distention, diarrhea, nausea and vomiting  Genitourinary: Negative for dysuria and frequency  Musculoskeletal: Negative for back pain and joint swelling  Skin: Negative for rash and wound  Neurological: Negative for weakness and numbness  Psychiatric/Behavioral: Negative for decreased concentration and suicidal ideas  Physical Exam  Physical Exam  Vitals and nursing note reviewed     Constitutional: Appearance: She is well-developed  HENT:      Head: Normocephalic and atraumatic  Eyes:      Conjunctiva/sclera: Conjunctivae normal       Pupils: Pupils are equal, round, and reactive to light  Neck:      Trachea: No tracheal deviation  Cardiovascular:      Rate and Rhythm: Normal rate and regular rhythm  Heart sounds: Normal heart sounds  No murmur heard  Pulmonary:      Effort: Pulmonary effort is normal  No respiratory distress  Breath sounds: Normal breath sounds  No wheezing or rales  Abdominal:      General: Bowel sounds are normal  There is no distension  Palpations: Abdomen is soft  Tenderness: There is abdominal tenderness (luq)  Musculoskeletal:         General: No deformity  Cervical back: Normal range of motion and neck supple  Skin:     General: Skin is warm and dry  Capillary Refill: Capillary refill takes less than 2 seconds  Neurological:      Mental Status: She is alert and oriented to person, place, and time  Sensory: No sensory deficit     Psychiatric:         Judgment: Judgment normal          Vital Signs  ED Triage Vitals [06/17/22 2301]   Temperature Pulse Respirations Blood Pressure SpO2   97 6 °F (36 4 °C) 74 20 151/84 100 %      Temp Source Heart Rate Source Patient Position - Orthostatic VS BP Location FiO2 (%)   Tympanic Monitor Sitting Left arm --      Pain Score       6           Vitals:    06/17/22 2301 06/18/22 0000 06/18/22 0030 06/18/22 0100   BP: 151/84 109/56 105/52 113/56   Pulse: 74 63 62 70   Patient Position - Orthostatic VS: Sitting Lying Lying Lying         Visual Acuity      ED Medications  Medications   HYDROmorphone (DILAUDID) injection 0 5 mg (0 5 mg Intravenous Given 6/17/22 2337)   dicyclomine (BENTYL) tablet 20 mg (20 mg Oral Given 6/18/22 0111)       Diagnostic Studies  Results Reviewed     Procedure Component Value Units Date/Time    Urine Microscopic [[id number]]  (Abnormal) Collected: 06/17/22 2335    Lab Status: Final result Specimen: Urine, Clean Catch Updated: 06/18/22 0017     RBC, UA 4-10 /hpf      WBC, UA 1-2 /hpf      Epithelial Cells Moderate /hpf      Bacteria, UA Occasional /hpf     HS Troponin 0hr (reflex protocol) [[id number]]  (Normal) Collected: 06/17/22 2334    Lab Status: Final result Specimen: Blood from Arm, Right Updated: 06/18/22 0007     hs TnI 0hr 2 ng/L     D-dimer, quantitative [[id number]]  (Normal) Collected: 06/17/22 2334    Lab Status: Final result Specimen: Blood from Arm, Right Updated: 06/18/22 0003     D-Dimer, Quant <0 27 ug/ml FEU     Narrative: In the evaluation for possible pulmonary embolism, in the appropriate (Well's Score of 4 or less) patient, the age adjusted d-dimer cutoff for this patient can be calculated as:    Age x 0 01 (in ug/mL) for Age-adjusted D-dimer exclusion threshold for a patient over 50 years      Comprehensive metabolic panel [[id number]] Collected: 06/17/22 2334    Lab Status: Final result Specimen: Blood from Arm, Right Updated: 06/17/22 0391     Sodium 137 mmol/L      Potassium 3 7 mmol/L      Chloride 102 mmol/L      CO2 28 mmol/L      ANION GAP 7 mmol/L      BUN 15 mg/dL      Creatinine 0 73 mg/dL      Glucose 95 mg/dL      Calcium 9 3 mg/dL      AST 16 U/L      ALT 11 U/L      Alkaline Phosphatase 50 U/L      Total Protein 6 5 g/dL      Albumin 4 2 g/dL      Total Bilirubin 0 33 mg/dL      eGFR 95 ml/min/1 73sq m     Narrative:      [place] guidelines for Chronic Kidney Disease (CKD):     Stage 1 with normal or high GFR (GFR > 90 mL/min/1 73 square meters)    Stage 2 Mild CKD (GFR = 60-89 mL/min/1 73 square meters)    Stage 3A Moderate CKD (GFR = 45-59 mL/min/1 73 square meters)    Stage 3B Moderate CKD (GFR = 30-44 mL/min/1 73 square meters)    Stage 4 Severe CKD (GFR = 15-29 mL/min/1 73 square meters)    Stage 5 End Stage CKD (GFR <15 mL/min/1 73 square meters)  Note: GFR calculation is accurate only with a steady state creatinine    Magnesium [[id number]]  (Normal) Collected: 06/17/22 2334    Lab Status: Final result Specimen: Blood from Arm, Right Updated: 06/17/22 2359     Magnesium 2 0 mg/dL     Lipase [[id number]]  (Normal) Collected: 06/17/22 2334    Lab Status: Final result Specimen: Blood from Arm, Right Updated: 06/17/22 2359     Lipase 22 u/L     Protime-INR [[id number]]  (Normal) Collected: 06/17/22 2334    Lab Status: Final result Specimen: Blood from Arm, Right Updated: 06/17/22 2356     Protime 11 7 seconds      INR 0 86    APTT [[id number]]  (Normal) Collected: 06/17/22 2334    Lab Status: Final result Specimen: Blood from Arm, Right Updated: 06/17/22 2356     PTT 30 seconds     UA w Reflex to Microscopic w Reflex to Culture [[id number]]  (Abnormal) Collected: 06/17/22 2335    Lab Status: Final result Specimen: Urine, Clean Catch Updated: 06/17/22 2342     Color, UA Yellow     Clarity, UA Clear     Specific [place], UA 1 020     pH, UA 6 0     Leukocytes, UA Trace     Nitrite, UA Negative     Protein, UA Negative mg/dl      Glucose, UA Negative mg/dl      Ketones, UA Negative mg/dl      Urobilinogen, UA 0 2 E U /dl      Bilirubin, UA Negative     Blood, UA Negative    CBC and differential [[id number]] Collected: 06/17/22 2334    Lab Status: Final result Specimen: Blood from Arm, Right Updated: 06/17/22 2341     WBC 9 29 Thousand/uL      RBC 4 15 Million/uL      Hemoglobin 12 9 g/dL      Hematocrit 40 6 %      MCV 98 fL      MCH 31 1 pg      MCHC 31 8 g/dL      RDW 13 1 %      MPV 9 8 fL      Platelets 029 Thousands/uL      nRBC 0 /100 WBCs      Neutrophils Relative 61 %      Immat GRANS % 0 %      Lymphocytes Relative 30 %      Monocytes Relative 7 %      Eosinophils Relative 2 %      Basophils Relative 0 %      Neutrophils Absolute 5 59 Thousands/µL      Immature Grans Absolute 0 02 Thousand/uL      Lymphocytes Absolute 2 82 Thousands/µL      Monocytes Absolute 0 64 Thousand/µL      Eosinophils Absolute 0 18 Thousand/µL Basophils Absolute 0 04 Thousands/µL                  CT renal stone study abdomen pelvis wo contrast   Final Result by Cynthia Luther MD (06/18 0045)         1  No evidence of nephrolithiasis or obstructive uropathy  2   No findings to indicate diverticulitis, colitis or bowel obstruction  Workstation performed: [id number]         XR chest 1 view portable   Final Result by Francisco Slaughter MD (06/18 0037)      No acute cardiopulmonary disease  Workstation performed: [id number]                    Procedures  ECG 12 Lead Documentation Only    Date/Time: 6/17/2022 11:35 PM  Performed by: Luis Negrete DO  Authorized by: Luis Negrete DO     ECG reviewed by me, the ED Provider: yes    Patient location:  ED  Previous ECG:     Previous ECG:  Unavailable    Comparison to cardiac monitor: No    Interpretation:     Interpretation: normal    Rate:     ECG rate:  66    ECG rate assessment: normal    Rhythm:     Rhythm: sinus rhythm    Ectopy:     Ectopy: none    QRS:     QRS axis:  Normal  Conduction:     Conduction: normal    ST segments:     ST segments:  Normal  T waves:     T waves: normal    Comments:      No evidence of acute cardiac ischemia             ED Course  ED Course as of 06/18/22 0454   Sat Jun 18, 2022   0102 Patient feels better, comfortable with discharge, GI follow up             HEART Risk Score    Flowsheet Row Most Recent Value   Heart Score Risk Calculator    History 0 Filed at: 06/17/2022 2336   ECG 0 Filed at: 06/17/2022 2336   Age 1 Filed at: 06/17/2022 2336   Risk Factors 1 Filed at: 06/17/2022 2336   Troponin 0 Filed at: 06/17/2022 2336   HEART Score 2 Filed at: 06/17/2022 2336                        SBIRT 20yo+    Flowsheet Row Most Recent Value   SBIRT (25 yo +)    In order to provide better care to our patients, we are screening all of our patients for alcohol and drug use  Would it be okay to ask you these screening questions?  No Filed at: 06/17/2022 2341   NICHOLE: How many times in the past year have you    Used an illegal drug or used a prescription medication for non-medical reasons? Never Filed at: 06/17/2022 2341                    [facility]  Number of Diagnoses or Management Options  Abdominal pain: new and requires workup  Chest pain: new and requires workup  Diagnosis management comments: Patient with left-sided upper quadrant and chest pain  Nonexertional, nonpleuritic  ACS unlikely  Distant history cholecystectomy       Amount and/or Complexity of Data Reviewed  Review and summarize past medical records: yes  Independent visualization of images, tracings, or specimens: yes    Risk of Complications, Morbidity, and/or Mortality  Presenting problems: high  Diagnostic procedures: minimal  Management options: moderate        Disposition  Final diagnoses:   Chest pain   Abdominal pain     Time reflects when diagnosis was documented in both MDM as applicable and the Disposition within this note     Time User Action Codes Description Comment    6/18/2022 12:58 AM Omaha Moulding Add [R07 9] Chest pain     6/18/2022 12:58 AM Kurt Moulding Add [R10 9] Abdominal pain       ED Disposition     ED Disposition   Discharge    Condition   Stable    Date/Time   Sat Jun 18, 2022 12:58 AM    Comment   Justen Sender discharge to home/self care                 Follow-up Information     Follow up With Specialties Details Why Contact Info    Gastroenterology  Schedule an appointment as soon as possible for a visit       Oseas Rawls, DO Family Medicine Schedule an appointment as soon as possible for a visit   [address] [place] [address]  [phone]            Discharge Medication List as of 6/18/2022  1:00 AM      START taking these medications    Details   dicyclomine (BENTYL) 20 mg tablet Take 1 tablet (20 mg total) by mouth every 8 (eight) hours as needed (abdominal cramping), Starting Sat 6/18/2022, Print             No discharge procedures on file     PDMP Review     None          ED Provider  Electronically Signed by           Albert Olivas,   06/18/22 0890

## 2022-06-19 LAB
ATRIAL RATE: 66 BPM
P AXIS: 30 DEGREES
PR INTERVAL: 132 MS
QRS AXIS: 49 DEGREES
QRSD INTERVAL: 96 MS
QT INTERVAL: 418 MS
QTC INTERVAL: 438 MS
T WAVE AXIS: 45 DEGREES
VENTRICULAR RATE: 66 BPM

## 2022-06-19 PROCEDURE — 93010 ELECTROCARDIOGRAM REPORT: CPT | Performed by: INTERNAL MEDICINE

## 2022-06-21 NOTE — TELEPHONE ENCOUNTER
Spoke with Karey, letting her know that I am working on trying to get an appointment with Gi for her. Called over and spoke with [facility] and next appointment for them for a new patient would be end of August. [facility] next appointment would be September/October. She mention that she is currently aware but was in the ER again for abdominal pain. [facility] from her recent visit there as well.

## 2022-06-21 NOTE — TELEPHONE ENCOUNTER
Are we able to put her on cancellation list for [facility] or does [facility] have anything better? Looks like she went to ER and had CT scan and labs done and everything was ok except some stool in her colon and gave bentyl to take but we could have come in for eval her or try again to see GI ??

## 2022-06-27 NOTE — TELEPHONE ENCOUNTER
Dr. Dumont,    I reached out to [facility] - they are going to contact pt within 1-3 days to get her set up for a colonoscopy since she is due and has not had one. I let pt know to call me by Friday if she has not heard from their office

## 2022-09-10 ENCOUNTER — OFFICE VISIT (OUTPATIENT)
Dept: URGENT CARE | Facility: CLINIC | Age: 52
End: 2022-09-10
Payer: COMMERCIAL

## 2022-09-10 VITALS
RESPIRATION RATE: 18 BRPM | DIASTOLIC BLOOD PRESSURE: 65 MMHG | SYSTOLIC BLOOD PRESSURE: 126 MMHG | WEIGHT: 155 LBS | OXYGEN SATURATION: 97 % | BODY MASS INDEX: 27.46 KG/M2 | HEART RATE: 91 BPM | TEMPERATURE: 98.1 F | HEIGHT: 63 IN

## 2022-09-10 DIAGNOSIS — H72.92 PERFORATION OF LEFT TYMPANIC MEMBRANE: ICD-10-CM

## 2022-09-10 DIAGNOSIS — J01.90 ACUTE SINUSITIS, RECURRENCE NOT SPECIFIED, UNSPECIFIED LOCATION: Primary | ICD-10-CM

## 2022-09-10 PROCEDURE — 99214 OFFICE O/P EST MOD 30 MIN: CPT | Performed by: PHYSICIAN ASSISTANT

## 2022-09-10 RX ORDER — CETIRIZINE HYDROCHLORIDE 10 MG/1
10 TABLET ORAL DAILY
COMMUNITY

## 2022-09-10 NOTE — PROGRESS NOTES
[facility]        NAME: Leotis Riedel is a 46 y o  female  : 1970    MRN: [id number]  DATE: September 10, 2022  TIME: 12:42 PM    Assessment and Plan   Acute sinusitis, recurrence not specified, unspecified location [J01 90]  1  Acute sinusitis, recurrence not specified, unspecified location     2  Perforation of left tympanic membrane  Ambulatory Referral to Otolaryngology       VISIT WAS COMPLETED DURING NETWORK OUTAGE  ALL PATIENT INSTRUCTIONS AND SCRIPTS WERE HAND WRITTEN AND GIVEN TO PATIENT  COPIES WILL BE SCANNED INTO THE CHART  Patient Instructions     Doxycycline and Ofloxacin drops as prescribed  Warm compresses over sinuses and steam treatments  Follow up with ENT  Follow up with PCP in 3-5 days  Proceed to  ER if symptoms worsen  Eat yogurt with live and active cultures and/or take a probiotic  Doxycycline may cause your skin to be more sensitive to sunlight  Chief Complaint     Chief Complaint   Patient presents with    Cold Like Symptoms     Cough, sinus pain, fever, congestion, left ear pain, left ear bloody discharge, x1week         History of Present Illness       Multiple negative COVID tests, last negative result yesterday  URI   This is a new problem  The current episode started in the past 7 days  The maximum temperature recorded prior to her arrival was 100 4 - 100 9 F (resolved)  Associated symptoms include congestion, coughing, ear pain, rhinorrhea and sinus pain  Pertinent negatives include no abdominal pain, diarrhea, headaches, nausea, rash, sneezing, sore throat, vomiting or wheezing  Treatments tried: flonase, sudafed, aleve, antihistamine, saline nasal spray  Review of Systems   Review of Systems   Constitutional: Positive for fever  Negative for chills and fatigue  HENT: Positive for congestion, ear discharge (bloody), ear pain, rhinorrhea, sinus pressure and sinus pain   Negative for postnasal drip, sneezing, sore throat and trouble swallowing  Respiratory: Positive for cough  Negative for chest tightness, shortness of breath and wheezing  Gastrointestinal: Negative for abdominal pain, diarrhea, nausea and vomiting  Musculoskeletal: Negative for myalgias  Skin: Negative for rash  Neurological: Negative for light-headedness and headaches  Current Medications       Current Outpatient Medications:     cetirizine (ZyrTEC) 10 mg tablet, Take 10 mg by mouth daily, Disp: , Rfl:     dicyclomine (BENTYL) 20 mg tablet, Take 1 tablet (20 mg total) by mouth every 8 (eight) hours as needed (abdominal cramping) (Patient not taking: Reported on 9/10/2022), Disp: 10 tablet, Rfl: 0    Current Allergies     Allergies as of 09/10/2022 - Reviewed 09/10/2022   Allergen Reaction Noted    Erythromycin Abdominal Pain 06/17/2022    Levofloxacin Arthralgia 06/17/2022    Penicillins Rash 06/17/2022            The following portions of the patient's history were reviewed and updated as appropriate: allergies, current medications, past family history, past medical history, past social history, past surgical history and problem list      History reviewed  No pertinent past medical history  History reviewed  No pertinent surgical history  History reviewed  No pertinent family history  Medications have been verified  Objective   /65   Pulse 91   Temp 98 1 °F (36 7 °C)   Resp 18   Ht 5' 3" (1 6 m)   Wt 70 3 kg (155 lb)   SpO2 97%   BMI 27 46 kg/m²   No LMP recorded  Patient is perimenopausal        Physical Exam     Physical Exam  Vitals reviewed  Constitutional:       General: She is not in acute distress  Appearance: She is well-developed  She is not diaphoretic  HENT:      Head: Normocephalic  Comments: No sinus TTP     Right Ear: Tympanic membrane and external ear normal       Ears:      Comments: L TM with perforation  Unable to fully appreciate size due to obstruction from bloody discharge       Nose: Nose normal       Mouth/Throat:      Pharynx: No oropharyngeal exudate or posterior oropharyngeal erythema  Eyes:      Conjunctiva/sclera: Conjunctivae normal    Cardiovascular:      Rate and Rhythm: Normal rate and regular rhythm  Heart sounds: Normal heart sounds  No murmur heard  No friction rub  No gallop  Pulmonary:      Effort: Pulmonary effort is normal  No respiratory distress  Breath sounds: Normal breath sounds  No wheezing or rales  Chest:      Chest wall: No tenderness  Lymphadenopathy:      Cervical: No cervical adenopathy  Skin:     General: Skin is warm  Neurological:      Mental Status: She is alert and oriented to person, place, and time  Psychiatric:         Behavior: Behavior normal          Thought Content:  Thought content normal          Judgment: Judgment normal

## 2022-09-12 ENCOUNTER — TELEPHONE (OUTPATIENT)
Dept: PRIMARY CARE | Facility: CLINIC | Age: 52
End: 2022-09-12

## 2022-09-12 NOTE — TELEPHONE ENCOUNTER
patient went to urgent care and was diagnosed with a sinus infection and ruptured ear drum.  She was rxd doxycycline.  Last night she started having severe nausea and dizziness, also diarreah .  She has stopped taking it and symptoms have subsided.  She is requesting a call back to see if she should continue taking the medication.

## 2022-09-12 NOTE — TELEPHONE ENCOUNTER
Spoke with Karey, she reports that [place]. She originally went to [facility] due to her ear rupturing. She woke up that morning and there was blood all over. Tried to put a tissue to her hear and there was blood on that. While she was there, she was also diagnosed as well with a sinus infection. She was placed on doxy for her symptoms. She is feeling better today. She is able to breathe, still nasally. She is dealing with her ear popping and drainage as well. She does have an appointment with [facility] on 9/14. She has not taking the doxy since she stopped it and symptoms have improved. Taking Flonase,Suidfed, and zinc for the time being.

## 2022-09-14 ENCOUNTER — OFFICE VISIT (OUTPATIENT)
Dept: PRIMARY CARE | Facility: CLINIC | Age: 52
End: 2022-09-14
Payer: COMMERCIAL

## 2022-09-14 VITALS
WEIGHT: 156.4 LBS | DIASTOLIC BLOOD PRESSURE: 90 MMHG | HEIGHT: 63 IN | RESPIRATION RATE: 15 BRPM | SYSTOLIC BLOOD PRESSURE: 126 MMHG | OXYGEN SATURATION: 98 % | BODY MASS INDEX: 27.71 KG/M2 | HEART RATE: 77 BPM

## 2022-09-14 DIAGNOSIS — H72.92 TYMPANIC MEMBRANE PERFORATION, LEFT: ICD-10-CM

## 2022-09-14 DIAGNOSIS — Z13.220 SCREENING, LIPID: ICD-10-CM

## 2022-09-14 DIAGNOSIS — J01.90 ACUTE NON-RECURRENT SINUSITIS, UNSPECIFIED LOCATION: ICD-10-CM

## 2022-09-14 DIAGNOSIS — Z00.00 ROUTINE PHYSICAL EXAMINATION: Primary | ICD-10-CM

## 2022-09-14 PROCEDURE — 99396 PREV VISIT EST AGE 40-64: CPT | Performed by: NURSE PRACTITIONER

## 2022-09-14 PROCEDURE — 3008F BODY MASS INDEX DOCD: CPT | Performed by: NURSE PRACTITIONER

## 2022-09-14 RX ORDER — OFLOXACIN 3 MG/ML
SOLUTION/ DROPS OPHTHALMIC
COMMUNITY
Start: 2022-09-10 | End: 2023-03-22 | Stop reason: ALTCHOICE

## 2022-09-14 RX ORDER — FLUTICASONE FUROATE 27.5 UG/1
SPRAY, METERED NASAL AS NEEDED
COMMUNITY
Start: 2022-01-14

## 2022-09-14 RX ORDER — METHYLPREDNISOLONE 4 MG/1
TABLET ORAL
Qty: 21 TABLET | Refills: 0 | Status: SHIPPED | OUTPATIENT
Start: 2022-09-14 | End: 2022-09-21

## 2022-09-14 RX ORDER — METHYLPREDNISOLONE 4 MG/1
TABLET ORAL
Qty: 21 TABLET | Refills: 0 | Status: CANCELLED | OUTPATIENT
Start: 2022-09-14 | End: 2022-09-21

## 2022-09-14 ASSESSMENT — ENCOUNTER SYMPTOMS
WHEEZING: 0
EYE PAIN: 0
LIGHT-HEADEDNESS: 0
BACK PAIN: 0
WEAKNESS: 0
COLOR CHANGE: 0
POLYPHAGIA: 0
ADENOPATHY: 0
SINUS PRESSURE: 1
ARTHRALGIAS: 0
FEVER: 0
EYE DISCHARGE: 0
APPETITE CHANGE: 0
MYALGIAS: 0
EYE REDNESS: 0
NUMBNESS: 0
ABDOMINAL PAIN: 0
HEMATURIA: 0
COUGH: 0
SORE THROAT: 0
HEADACHES: 0
CONFUSION: 0
SINUS PAIN: 1
TROUBLE SWALLOWING: 0
CHEST TIGHTNESS: 0
DECREASED CONCENTRATION: 0
DYSURIA: 0
POLYDIPSIA: 0
NAUSEA: 0
NERVOUS/ANXIOUS: 0
FATIGUE: 0
SHORTNESS OF BREATH: 0
CHILLS: 0
CONSTIPATION: 0
DIZZINESS: 0
FLANK PAIN: 0
DIARRHEA: 0
PALPITATIONS: 0
FREQUENCY: 0
VOMITING: 0
AGITATION: 0
RHINORRHEA: 0

## 2022-09-14 NOTE — ASSESSMENT & PLAN NOTE
Physical exam wnl.  Continue with healthy diet and exercise.   Up to date with gyn/mammo.  Up to date with colonoscopy.  Shingles vaccine due- additional information provided.  Complete routine screening labs.  Follow up in 1 year.

## 2022-09-14 NOTE — ASSESSMENT & PLAN NOTE
Still with sinus pressure and pain, nasal congestion improving.  Will hold off on additional antibiotics at this time.  Start medrol taper,  Continue nasal sprays as directed.   Pt advised to update office of any new or worsening symptoms.

## 2022-09-14 NOTE — PROGRESS NOTES
[address], [address]  [place] [address]  Phone [phone] Fax [phone]     Reason for visit:   Chief Complaint   Patient presents with    Annual Exam    Allergic Reaction     Pt had an allergic reaction over the weekend to Doxycycline       HPI   Karey Kaur is a 52 y.o. female who presents for physical exam. She reports feeling well overall.    Preventative care:  Mammo- 2021  Gyn- [facility]  CRS- colonoscopy 2022, repeat 10 yr  Shingles vaccine- due  Diet/exercise- working on weight loss with [employer] program, 15 lb weight loss over the last year.    Recent urgent care visit for acute sinusitis and L TM perforation,  She was rxed doxycycline, discontinued due to side effects (nausea, vomitng, dizziness). She continues ofloxacin ear drops as prescribed.  Using Zicam, sinucalm, flonase, saline sprays. Reports some bilateral ear fullness and frontal sinus pressure. Minimal ear pain.           Medical History:  History reviewed. No pertinent past medical history.    Surgical History:  Past Surgical History:   Procedure Laterality Date    ANKLE SURGERY       SECTION      CHOLECYSTECTOMY      LASIK      LITHOTRIPSY      MYRINGOTOMY W/ TUBES      TONSILLECTOMY      TUBAL LIGATION         Social History:  Social History     Social History Narrative    Not on file       Family History:  Family History   Problem Relation Age of Onset    Diabetes Biological Mother     Hypertension Biological Mother     Hyperlipidemia Biological Father     Hypertension Biological Father     Breast cancer Maternal Grandmother     Aneurysm Mother's Brother        Allergies:  Nut - unspecified, Cinnamon, Doxycycline, Erythromycin, Lavender (lavandula angustifolia), Levofloxacin, Meperidine hcl, Adhesive, Cephalosporins, Latex, Penicillins, and Sulfa (sulfonamide antibiotics)    Current Medications:  Current Outpatient Medications   Medication Sig  Dispense Refill    ascorbic acid/zinc (ZINC SULFATE-VITAMIN C ORAL) Take 1 tablet by mouth daily.      cetirizine (ZyrTEC) 10 mg tablet Take 10 mg by mouth daily.      cholecalciferol, vitamin D3, 25 mcg (1,000 unit) capsule Take 1,000 Units by mouth daily.      fluticasone (FLONASE SENSIMIST) 27.5 mcg/actuation nasal spray       methylPREDNISolone (MEDROL DOSEPACK) 4 mg tablet Follow package directions. 21 tablet 0    ofloxacin (OCUFLOX) 0.3 % ophthalmic solution insert 10 drops into left ear once daily for 7 days      biotin 5 mg tablet Take 1 tablet by mouth daily.      mecobalamin, vitamin B12, (B12 ACTIVE) 1,000 mcg tablet,chewable Take 1 tablet by mouth daily.       No current facility-administered medications for this visit.       Review of Systems:  Review of Systems   Constitutional: Negative for appetite change, chills, fatigue and fever.   HENT: Positive for ear pain, sinus pressure and sinus pain. Negative for congestion, ear discharge, postnasal drip, rhinorrhea, sore throat and trouble swallowing.    Eyes: Negative for pain, discharge, redness and visual disturbance.   Respiratory: Negative for cough, chest tightness, shortness of breath and wheezing.    Cardiovascular: Negative for chest pain, palpitations and leg swelling.   Gastrointestinal: Negative for abdominal pain, constipation, diarrhea, nausea and vomiting.   Endocrine: Negative for polydipsia, polyphagia and polyuria.   Genitourinary: Negative for dysuria, flank pain, frequency, hematuria and urgency.   Musculoskeletal: Negative for arthralgias, back pain and myalgias.   Skin: Negative for color change and rash.   Neurological: Negative for dizziness, syncope, weakness, light-headedness, numbness and headaches.   Hematological: Negative for adenopathy.   Psychiatric/Behavioral: Negative for agitation, confusion, decreased concentration and suicidal ideas. The patient is not nervous/anxious.        Objective     Vital Signs:  Vitals:     09/14/22 1404   BP: 126/90   Pulse: 77   Resp: 15   SpO2: 98%       BMI:  Body mass index is 27.71 kg/m².     Physical Exam  Vitals reviewed.   Constitutional:       Appearance: Normal appearance.   HENT:      Head: Normocephalic and atraumatic.      Right Ear: Tympanic membrane, ear canal and external ear normal.      Left Ear: Ear canal and external ear normal. Tympanic membrane is perforated (small perforation with signs of partial healing).      Nose: Congestion (mild erythema of nasal turbinates, frontal sinus tenderness) present.      Mouth/Throat:      Mouth: Mucous membranes are moist.      Pharynx: Oropharynx is clear.   Eyes:      Extraocular Movements: Extraocular movements intact.      Conjunctiva/sclera: Conjunctivae normal.      Pupils: Pupils are equal, round, and reactive to light.   Cardiovascular:      Rate and Rhythm: Normal rate.      Heart sounds: Normal heart sounds.   Pulmonary:      Effort: Pulmonary effort is normal.      Breath sounds: Normal breath sounds. No wheezing.   Abdominal:      General: Bowel sounds are normal.      Palpations: Abdomen is soft.      Tenderness: There is no abdominal tenderness.   Musculoskeletal:      Right lower leg: No edema.      Left lower leg: No edema.   Lymphadenopathy:      Cervical: No cervical adenopathy.   Skin:     General: Skin is warm and dry.   Neurological:      General: No focal deficit present.      Mental Status: She is alert and oriented to person, place, and time.   Psychiatric:         Mood and Affect: Mood normal.         Behavior: Behavior normal.         Recent labs before today:     Lab Results   Component Value Date    WBC 8.0 10/15/2021    HGB 13.4 10/15/2021    HCT 40.3 10/15/2021     10/15/2021    CHOL 186 10/15/2021    TRIG 50 10/15/2021    HDL 78 10/15/2021    ALT 11 10/15/2021    AST 18 10/15/2021     10/15/2021    K 4.7 10/15/2021     10/15/2021    CREATININE 0.72 10/15/2021    BUN 16 10/15/2021    CO2 23  10/15/2021    TSH 2.430 10/15/2021        Procedures       Problem List Items Addressed This Visit        Infectious/Inflammatory    Acute non-recurrent sinusitis     Still with sinus pressure and pain, nasal congestion improving.  Will hold off on additional antibiotics at this time.  Start medrol taper,  Continue nasal sprays as directed.   Pt advised to update office of any new or worsening symptoms.              Ears/Nose/Throat    Tympanic membrane perforation, left     Small TM perforation with evidence of partial healing, Continue ear drops as directed.                Other    Routine physical examination - Primary     Physical exam wnl.  Continue with healthy diet and exercise.   Up to date with gyn/mammo.  Up to date with colonoscopy.  Shingles vaccine due- additional information provided.  Complete routine screening labs.  Follow up in 1 year.           Relevant Orders    CBC and differential    Comprehensive metabolic panel    TSH 3rd Generation    T4, free    Lipid panel    Vitamin D 25 hydroxy    T3      Other Visit Diagnoses     Screening, lipid        Relevant Orders    Lipid panel                  MARY Caruso  9/14/2022

## 2022-09-14 NOTE — PATIENT INSTRUCTIONS
https://www.cdc.gov/vaccines/vpd/shingles/public/shingrix/index.html    CDC recommends that adults 50 years and older get two doses of the shingles vaccine called Shingrix (recombinant zoster vaccine) to prevent shingles and the complications from the disease.    Shingrix provides strong protection against shingles and PHN. In adults 50 years and older who have healthy immune systems, Shingrix is more than 90% effective at preventing shingles and PHN. Immunity stays strong for at least the first 7 years after vaccination. In adults with weakened immune systems, studies show that Shingrix is 68%-91% effective in preventing shingles, depending on the condition that affects the immune system.    Chickenpox and shingles are related because they are caused by the same virus (varicella-zoster virus). After a person recovers from chickenpox, the virus stays dormant (inactive) in the body. It can reactivate years later and cause shingles.      How Well Does Shingrix Work?  Two doses of Shingrix provide strong protection against shingles and postherpetic neuralgia (PHN), the most common complication of shingles.    In adults 50 to 69 years old with healthy immune systems, Shingrix was 97% effective in preventing shingles; in adults 70 years and older, Shingrix was 91% effective.  In adults 50 years and older, Shingrix was 91% effective in preventing PHN; in adults 70 years and older, Shingrix was 89% effective.  In adults with weakened immune systems, Shingrix was between 68% and 91% effective in preventing shingles, depending on their underlying immunocompromising condition.  In people 70 years and older who had healthy immune systems, Shingrix immunity remained high throughout 7 years following vaccination.    What Are the Possible Side Effects of Shingrix?  Studies show that Shingrix is safe. The vaccine helps your body create a strong defense against shingles. As a result, you are likely to have temporary side effects  from getting the shots. The side effects might affect your ability to do normal daily activities for 2 to 3 days.    Most people got a sore arm with mild or moderate pain after getting Shingrix, and some also had redness and swelling where they got the shot. Some people felt tired, had muscle pain, a headache, shivering, fever, stomach pain, or nausea. Some people who got Shingrix experienced side effects that prevented them from doing regular activities. Symptoms went away on their own in about 2 to 3 days. Side effects were more common in younger people.    You might have a reaction to the first or second dose of Shingrix, or both doses. If you experience side effects, you may choose to take over-the-counter pain medicine such as ibuprofen or acetaminophen.

## 2022-11-30 LAB
BASOPHILS # BLD AUTO: 0 X10E3/UL (ref 0–0.2)
BASOPHILS NFR BLD AUTO: 1 %
EOSINOPHIL # BLD AUTO: 0.2 X10E3/UL (ref 0–0.4)
EOSINOPHIL NFR BLD AUTO: 3 %
ERYTHROCYTE [DISTWIDTH] IN BLOOD BY AUTOMATED COUNT: 12.5 % (ref 11.7–15.4)
HCT VFR BLD AUTO: 42.2 % (ref 34–46.6)
HGB BLD-MCNC: 14.2 G/DL (ref 11.1–15.9)
IMM GRANULOCYTES # BLD AUTO: 0 X10E3/UL (ref 0–0.1)
IMM GRANULOCYTES NFR BLD AUTO: 0 %
LYMPHOCYTES # BLD AUTO: 1.7 X10E3/UL (ref 0.7–3.1)
LYMPHOCYTES NFR BLD AUTO: 22 %
MCH RBC QN AUTO: 31 PG (ref 26.6–33)
MCHC RBC AUTO-ENTMCNC: 33.6 G/DL (ref 31.5–35.7)
MCV RBC AUTO: 92 FL (ref 79–97)
MONOCYTES # BLD AUTO: 0.7 X10E3/UL (ref 0.1–0.9)
MONOCYTES NFR BLD AUTO: 9 %
NEUTROPHILS # BLD AUTO: 5.1 X10E3/UL (ref 1.4–7)
NEUTROPHILS NFR BLD AUTO: 65 %
PLATELET # BLD AUTO: 337 X10E3/UL (ref 150–450)
RBC # BLD AUTO: 4.58 X10E6/UL (ref 3.77–5.28)
WBC # BLD AUTO: 7.7 X10E3/UL (ref 3.4–10.8)

## 2022-12-01 LAB
25(OH)D3+25(OH)D2 SERPL-MCNC: 47.2 NG/ML (ref 30–100)
ALBUMIN SERPL-MCNC: 4.3 G/DL (ref 3.8–4.9)
ALBUMIN/GLOB SERPL: 2 {RATIO} (ref 1.2–2.2)
ALP SERPL-CCNC: 61 IU/L (ref 44–121)
ALT SERPL-CCNC: 13 IU/L (ref 0–32)
AST SERPL-CCNC: 17 IU/L (ref 0–40)
BILIRUB SERPL-MCNC: 0.4 MG/DL (ref 0–1.2)
BUN SERPL-MCNC: 13 MG/DL (ref 6–24)
BUN/CREAT SERPL: 17 (ref 9–23)
CALCIUM SERPL-MCNC: 9.3 MG/DL (ref 8.7–10.2)
CHLORIDE SERPL-SCNC: 102 MMOL/L (ref 96–106)
CHOLEST SERPL-MCNC: 175 MG/DL (ref 100–199)
CO2 SERPL-SCNC: 26 MMOL/L (ref 20–29)
CREAT SERPL-MCNC: 0.77 MG/DL (ref 0.57–1)
EGFRCR SERPLBLD CKD-EPI 2021: 93 ML/MIN/1.73
GLOBULIN SER CALC-MCNC: 2.1 G/DL (ref 1.5–4.5)
GLUCOSE SERPL-MCNC: 78 MG/DL (ref 70–99)
HDLC SERPL-MCNC: 81 MG/DL
LDLC SERPL CALC-MCNC: 85 MG/DL (ref 0–99)
POTASSIUM SERPL-SCNC: 4.4 MMOL/L (ref 3.5–5.2)
PROT SERPL-MCNC: 6.4 G/DL (ref 6–8.5)
SODIUM SERPL-SCNC: 141 MMOL/L (ref 134–144)
T3FREE SERPL-MCNC: 2.8 PG/ML (ref 2–4.4)
T4 FREE SERPL-MCNC: 1.2 NG/DL (ref 0.82–1.77)
TRIGL SERPL-MCNC: 41 MG/DL (ref 0–149)
TSH SERPL DL<=0.005 MIU/L-ACNC: 2.79 UIU/ML (ref 0.45–4.5)
VLDLC SERPL CALC-MCNC: 9 MG/DL (ref 5–40)

## 2023-01-03 ENCOUNTER — TRANSCRIBE ORDERS (OUTPATIENT)
Dept: REGISTRATION | Facility: CLINIC | Age: 53
End: 2023-01-03

## 2023-01-03 DIAGNOSIS — Z12.31 ENCOUNTER FOR SCREENING MAMMOGRAM FOR MALIGNANT NEOPLASM OF BREAST: Primary | ICD-10-CM

## 2023-02-20 ENCOUNTER — HOSPITAL ENCOUNTER (OUTPATIENT)
Dept: RADIOLOGY | Facility: CLINIC | Age: 53
Discharge: HOME | End: 2023-02-20
Attending: OBSTETRICS & GYNECOLOGY
Payer: COMMERCIAL

## 2023-02-20 DIAGNOSIS — Z12.31 ENCOUNTER FOR SCREENING MAMMOGRAM FOR MALIGNANT NEOPLASM OF BREAST: ICD-10-CM

## 2023-02-20 PROCEDURE — 77063 BREAST TOMOSYNTHESIS BI: CPT

## 2023-03-20 ENCOUNTER — TELEPHONE (OUTPATIENT)
Dept: PRIMARY CARE | Facility: CLINIC | Age: 53
End: 2023-03-20
Payer: COMMERCIAL

## 2023-03-20 NOTE — TELEPHONE ENCOUNTER
Patient LVM stating that she was seen at [facility] ER 3/17/2023 evening after being rushed by ambulance from [facility] with elevated heart rate: 168 beats per minute.     Patient stated that they were in the Er for 12 hours waiting and was never admitted nor seen by the doctor.     Patient stated that blood tests were taken (ekg, blood test and x-ray). Patient never had results red to her and would like to speak to her pcp to discuss these results.    LVM to schedule Hospital follow up.

## 2023-03-22 ENCOUNTER — OFFICE VISIT (OUTPATIENT)
Dept: PRIMARY CARE | Facility: CLINIC | Age: 53
End: 2023-03-22
Payer: COMMERCIAL

## 2023-03-22 VITALS
DIASTOLIC BLOOD PRESSURE: 80 MMHG | OXYGEN SATURATION: 96 % | WEIGHT: 160 LBS | SYSTOLIC BLOOD PRESSURE: 120 MMHG | TEMPERATURE: 97.3 F | RESPIRATION RATE: 16 BRPM | HEART RATE: 74 BPM | HEIGHT: 63 IN | BODY MASS INDEX: 28.35 KG/M2

## 2023-03-22 DIAGNOSIS — T14.8XXA BRUISING: ICD-10-CM

## 2023-03-22 DIAGNOSIS — R07.9 CHEST PAIN, UNSPECIFIED TYPE: ICD-10-CM

## 2023-03-22 DIAGNOSIS — R53.83 OTHER FATIGUE: ICD-10-CM

## 2023-03-22 DIAGNOSIS — Z13.820 SCREENING FOR OSTEOPOROSIS: ICD-10-CM

## 2023-03-22 DIAGNOSIS — R00.2 PALPITATIONS: Primary | ICD-10-CM

## 2023-03-22 PROBLEM — H72.92 TYMPANIC MEMBRANE PERFORATION, LEFT: Status: RESOLVED | Noted: 2022-09-14 | Resolved: 2023-03-22

## 2023-03-22 PROBLEM — Z00.00 ROUTINE PHYSICAL EXAMINATION: Status: RESOLVED | Noted: 2022-09-14 | Resolved: 2023-03-22

## 2023-03-22 PROBLEM — J01.90 ACUTE NON-RECURRENT SINUSITIS: Status: RESOLVED | Noted: 2022-09-14 | Resolved: 2023-03-22

## 2023-03-22 PROCEDURE — 99214 OFFICE O/P EST MOD 30 MIN: CPT | Performed by: FAMILY MEDICINE

## 2023-03-22 PROCEDURE — 3008F BODY MASS INDEX DOCD: CPT | Performed by: FAMILY MEDICINE

## 2023-03-22 RX ORDER — LIDOCAINE HYDROCHLORIDE 20 MG/ML
SOLUTION ORAL; TOPICAL
COMMUNITY
Start: 2022-12-20 | End: 2023-04-20 | Stop reason: ALTCHOICE

## 2023-03-22 RX ORDER — TRANEXAMIC ACID 650 MG/1
TABLET ORAL
COMMUNITY
Start: 2023-01-20 | End: 2023-03-22 | Stop reason: ALTCHOICE

## 2023-03-22 RX ORDER — KETOCONAZOLE 20 MG/G
CREAM TOPICAL AS NEEDED
COMMUNITY
Start: 2023-02-03 | End: 2025-02-05

## 2023-03-22 ASSESSMENT — ENCOUNTER SYMPTOMS
CONSTIPATION: 0
FEVER: 0
ARTHRALGIAS: 0
CHILLS: 0
SHORTNESS OF BREATH: 0
WHEEZING: 0
BLOOD IN STOOL: 0
DYSURIA: 0
COUGH: 0
HEADACHES: 0
PALPITATIONS: 1
FATIGUE: 0
FREQUENCY: 0
SLEEP DISTURBANCE: 0
RHINORRHEA: 0
DIZZINESS: 0
MYALGIAS: 0
HEMATURIA: 0
SORE THROAT: 0
NERVOUS/ANXIOUS: 0
ABDOMINAL PAIN: 0

## 2023-03-22 NOTE — PROGRESS NOTES
Subjective      Patient ID: Karey Kaur is a 52 y.o. female.    She is here for hospital follow up. She was feeling ok until last wk when she was away for work at [employer] and was carrying her bags and felt very winded and had to rest and then next day felt off and was checking heart rates on apple watch and she was resting and pulse was ranging from 80-160s and went home next day and went to eat lunch and heart rate went up to 160s and felt like she was going to pass out and [facility] [place] and did CXR and vitals and EKG and called EMS and took her to [facility] ER and did labs/EKG and CXR and no one talked to her so she went home and slept. She is feeling alittle better but still has episodes of heart racing and did have some R chest pain last night. No SOB. No new changes in meds but hasn't had menstrual cycles since Dec. She did have something similar back in 2021 and did see cardio and everything ended up being ok. Also has toenail fungus of great toenail and was using topical ciclopirox when all this happened.       The following have been reviewed and updated as appropriate in this visit:   Tobacco  Allergies  Meds  Problems  Med Hx  Surg Hx  Fam Hx       Review of Systems   Constitutional: Negative for chills, fatigue and fever.   HENT: Negative for ear discharge, ear pain, postnasal drip, rhinorrhea and sore throat.    Respiratory: Negative for cough, shortness of breath and wheezing.    Cardiovascular: Positive for chest pain and palpitations.   Gastrointestinal: Negative for abdominal pain, blood in stool and constipation.   Genitourinary: Negative for dysuria, frequency and hematuria.   Musculoskeletal: Negative for arthralgias and myalgias.   Skin: Negative for rash.   Neurological: Negative for dizziness and headaches.   Psychiatric/Behavioral: Negative for sleep disturbance and suicidal ideas. The patient is not nervous/anxious.        Current Outpatient  Medications   Medication Sig Dispense Refill   • ascorbic acid/zinc (ZINC SULFATE-VITAMIN C ORAL) Take 1 tablet by mouth daily.     • cetirizine (ZyrTEC) 10 mg tablet Take 10 mg by mouth daily.     • ciclopirox olamine (CICLOPIROX TOP) Apply topically.     • fluticasone (FLONASE SENSIMIST) 27.5 mcg/actuation nasal spray      • HAIR, SKIN AND NAILS, BIOTIN, ORAL      • ketoconazole (NIZORAL) 2 % cream APPLY 1 APPLICATION ON THE SKIN TWICE A DAY TO ABDOMEN X 3 WEEKS     • lidocaine 2 % solution GARGLE WITH 1 TEASPOONFUL & SIP OF WATER FOR 1 MINUTE & SPIT OUT, EVERY 3 HOURS AS NEEDED     • multivitamin with minerals (ONE DAILY 50 PLUS ORAL)      • NOT IN DATABASE Estrotone (Herbal Hormonal Balance)       No current facility-administered medications for this visit.     History reviewed. No pertinent past medical history.  Family History   Problem Relation Age of Onset   • Diabetes Biological Mother    • Hypertension Biological Mother    • Hyperlipidemia Biological Father    • Hypertension Biological Father    • Breast cancer Maternal Grandmother    • Aneurysm Mother's Brother    • Hypertension Half-Brother      Past Surgical History:   Procedure Laterality Date   • ANKLE SURGERY     •  SECTION     • CHOLECYSTECTOMY     • LASIK     • LITHOTRIPSY     • MYRINGOTOMY W/ TUBES     • TONSILLECTOMY     • TUBAL LIGATION       Social History     Socioeconomic History   • Marital status:      Spouse name: Not on file   • Number of children: Not on file   • Years of education: Not on file   • Highest education level: Not on file   Occupational History   • Not on file   Tobacco Use   • Smoking status: Never   • Smokeless tobacco: Never   Vaping Use   • Vaping status: Not on file   Substance and Sexual Activity   • Alcohol use: Yes     Comment: Social   • Drug use: No   • Sexual activity: Not on file   Other Topics Concern   • Not on file   Social History Narrative   • Not on file     Social Determinants of Health  "    Financial Resource Strain: Not on file   Food Insecurity: Not on file   Transportation Needs: Not on file   Physical Activity: Not on file   Stress: Not on file   Social Connections: Not on file   Intimate Partner Violence: Not on file   Housing Stability: Not on file     Allergies   Allergen Reactions   • Nut - Unspecified GI intolerance   • Cinnamon    • Doxycycline Other (see comments)     Dizziness and nausea.   • Erythromycin GI intolerance   • Lavender (Lavandula Angustifolia)    • Levofloxacin Other (see comments)   • Meperidine Hcl Itching   • Adhesive Rash   • Cephalosporins Rash   • Latex Rash   • Penicillins Rash   • Sulfa (Sulfonamide Antibiotics) Rash       Objective   Visit Vitals  /80   Pulse 74   Temp 36.3 °C (97.3 °F) (Temporal)   Resp 16   Ht 1.6 m (5' 3\")   Wt 72.6 kg (160 lb)   LMP  (LMP Unknown)   SpO2 96%   BMI 28.34 kg/m²     Physical Exam  Vitals and nursing note reviewed.   Constitutional:       Appearance: Normal appearance.   HENT:      Right Ear: Tympanic membrane normal. There is no impacted cerumen.      Left Ear: Tympanic membrane normal. There is no impacted cerumen.      Nose: Nose normal. No rhinorrhea.      Mouth/Throat:      Mouth: Mucous membranes are moist.      Pharynx: Oropharynx is clear. No posterior oropharyngeal erythema.   Cardiovascular:      Rate and Rhythm: Normal rate and regular rhythm.      Heart sounds: No murmur heard.  Pulmonary:      Effort: Pulmonary effort is normal.      Breath sounds: Normal breath sounds. No wheezing.   Abdominal:      General: Bowel sounds are normal.      Palpations: Abdomen is soft.      Tenderness: There is no abdominal tenderness.   Musculoskeletal:         General: Normal range of motion.      Cervical back: Normal range of motion and neck supple.   Skin:     General: Skin is warm.      Findings: No rash.      Comments: Great toenail- blackened portion of toenail   Neurological:      General: No focal deficit present.      " Mental Status: She is alert and oriented to person, place, and time.      Cranial Nerves: No cranial nerve deficit.   Psychiatric:         Mood and Affect: Mood normal.         Behavior: Behavior normal.         Assessment/Plan   Problem List Items Addressed This Visit    None  Visit Diagnoses     Palpitations    -  Primary    Relevant Orders    [facility] [employer] Holter monitor - 48 hour    CBC and differential    TSH    Comprehensive metabolic panel    Hemoglobin A1c    Lipid panel    Iron and TIBC    Transthoracic echo (TTE) complete    CT HEART CORONARY ARTERY CALCIUM SCORE WITHOUT IV CONTRAST    Chest pain, unspecified type        Relevant Orders    Transthoracic echo (TTE) complete    CT HEART CORONARY ARTERY CALCIUM SCORE WITHOUT IV CONTRAST    Bruising        Relevant Orders    Vitamin B12    Vitamin D 25 hydroxy    PTT    Protime-INR    Other fatigue        Relevant Orders    Iron and TIBC    Vitamin B12    Vitamin D 25 hydroxy    Screening for osteoporosis        Relevant Orders    DEXA BONE DENSITY      She is feeling ok at this time but given her episodic racing heart we will start with 48 holter monitor along with updated fasting labs and echo and she will cont to rest and stay hydrated and will check calcium score and she will also see podiatrist for toenail fungus as well. Case was reviewed with cardiologist as well and we will see how she does and if symptoms worsen she will go back to ER!    Kenia Dumont, DO  3/22/2023

## 2023-03-23 LAB
ALBUMIN SERPL-MCNC: 4.4 G/DL (ref 3.8–4.9)
ALBUMIN/GLOB SERPL: 2.2 {RATIO} (ref 1.2–2.2)
ALP SERPL-CCNC: 56 IU/L (ref 44–121)
ALT SERPL-CCNC: 13 IU/L (ref 0–32)
APTT PPP: 28 SEC (ref 24–33)
AST SERPL-CCNC: 18 IU/L (ref 0–40)
BASOPHILS # BLD AUTO: 0 X10E3/UL (ref 0–0.2)
BASOPHILS NFR BLD AUTO: 1 %
BILIRUB SERPL-MCNC: 0.4 MG/DL (ref 0–1.2)
BUN SERPL-MCNC: 15 MG/DL (ref 6–24)
BUN/CREAT SERPL: 20 (ref 9–23)
CALCIUM SERPL-MCNC: 9.2 MG/DL (ref 8.7–10.2)
CHLORIDE SERPL-SCNC: 102 MMOL/L (ref 96–106)
CO2 SERPL-SCNC: 25 MMOL/L (ref 20–29)
CREAT SERPL-MCNC: 0.74 MG/DL (ref 0.57–1)
EGFRCR SERPLBLD CKD-EPI 2021: 97 ML/MIN/1.73
EOSINOPHIL # BLD AUTO: 0.2 X10E3/UL (ref 0–0.4)
EOSINOPHIL NFR BLD AUTO: 3 %
ERYTHROCYTE [DISTWIDTH] IN BLOOD BY AUTOMATED COUNT: 13.1 % (ref 11.7–15.4)
GLOBULIN SER CALC-MCNC: 2 G/DL (ref 1.5–4.5)
GLUCOSE SERPL-MCNC: 88 MG/DL (ref 70–99)
HBA1C MFR BLD: 5.5 % (ref 4.8–5.6)
HCT VFR BLD AUTO: 43.9 % (ref 34–46.6)
HGB BLD-MCNC: 14.5 G/DL (ref 11.1–15.9)
IMM GRANULOCYTES # BLD AUTO: 0 X10E3/UL (ref 0–0.1)
IMM GRANULOCYTES NFR BLD AUTO: 0 %
INR PPP: 1 (ref 0.9–1.2)
LYMPHOCYTES # BLD AUTO: 1.9 X10E3/UL (ref 0.7–3.1)
LYMPHOCYTES NFR BLD AUTO: 30 %
MCH RBC QN AUTO: 31.1 PG (ref 26.6–33)
MCHC RBC AUTO-ENTMCNC: 33 G/DL (ref 31.5–35.7)
MCV RBC AUTO: 94 FL (ref 79–97)
MONOCYTES # BLD AUTO: 0.5 X10E3/UL (ref 0.1–0.9)
MONOCYTES NFR BLD AUTO: 7 %
NEUTROPHILS # BLD AUTO: 3.7 X10E3/UL (ref 1.4–7)
NEUTROPHILS NFR BLD AUTO: 59 %
PLATELET # BLD AUTO: 330 X10E3/UL (ref 150–450)
POTASSIUM SERPL-SCNC: 4.5 MMOL/L (ref 3.5–5.2)
PROT SERPL-MCNC: 6.4 G/DL (ref 6–8.5)
PROTHROMBIN TIME: 10.3 SEC (ref 9.1–12)
RBC # BLD AUTO: 4.66 X10E6/UL (ref 3.77–5.28)
SODIUM SERPL-SCNC: 141 MMOL/L (ref 134–144)
T3 SERPL-MCNC: 107 NG/DL (ref 71–180)
VIT B12 SERPL-MCNC: 711 PG/ML (ref 232–1245)
WBC # BLD AUTO: 6.3 X10E3/UL (ref 3.4–10.8)

## 2023-03-24 LAB
25(OH)D3+25(OH)D2 SERPL-MCNC: 51.7 NG/ML (ref 30–100)
CHOLEST SERPL-MCNC: 183 MG/DL (ref 100–199)
HDLC SERPL-MCNC: 76 MG/DL
IRON SATN MFR SERPL: 19 % (ref 15–55)
IRON SERPL-MCNC: 74 UG/DL (ref 27–159)
LDLC SERPL CALC-MCNC: 98 MG/DL (ref 0–99)
TIBC SERPL-MCNC: 390 UG/DL (ref 250–450)
TRIGL SERPL-MCNC: 42 MG/DL (ref 0–149)
TSH SERPL DL<=0.005 MIU/L-ACNC: 2.37 UIU/ML (ref 0.45–4.5)
UIBC SERPL-MCNC: 316 UG/DL (ref 131–425)
VLDLC SERPL CALC-MCNC: 9 MG/DL (ref 5–40)

## 2023-03-28 ENCOUNTER — HOSPITAL ENCOUNTER (OUTPATIENT)
Dept: CARDIOLOGY | Facility: CLINIC | Age: 53
Discharge: HOME | End: 2023-03-28
Attending: FAMILY MEDICINE
Payer: COMMERCIAL

## 2023-03-28 DIAGNOSIS — R00.2 PALPITATIONS: ICD-10-CM

## 2023-03-30 ENCOUNTER — HOSPITAL ENCOUNTER (OUTPATIENT)
Dept: CARDIOLOGY | Facility: CLINIC | Age: 53
Discharge: HOME | End: 2023-03-30
Attending: FAMILY MEDICINE
Payer: COMMERCIAL

## 2023-03-30 VITALS
BODY MASS INDEX: 28.46 KG/M2 | SYSTOLIC BLOOD PRESSURE: 120 MMHG | DIASTOLIC BLOOD PRESSURE: 70 MMHG | WEIGHT: 160.6 LBS | HEIGHT: 63 IN

## 2023-03-30 DIAGNOSIS — R07.9 CHEST PAIN, UNSPECIFIED TYPE: ICD-10-CM

## 2023-03-30 DIAGNOSIS — R00.2 PALPITATIONS: ICD-10-CM

## 2023-03-30 LAB
AORTIC ROOT ANNULUS: 2.6 CM
ASCENDING AORTA: 2.7 CM
AV PEAK GRADIENT: 8 MMHG
AV PEAK VELOCITY-S: 1.45 M/S
AV VALVE AREA: 1.67 CM2
BSA FOR ECHO PROCEDURE: 1.8 M2
CUSP SEPARATION: 1.6 CM
E WAVE DECELERATION TIME: 223 MS
E/A RATIO: 0.9
E/E' RATIO: 10.3
E/LAT E' RATIO: 7.3
EDV (BP): 90.7 CM3
EF (A4C): 68.7 %
EF A2C: 72.1 %
EJECTION FRACTION: 69.9 %
EST RIGHT VENT SYSTOLIC PRESSURE BY TRICUSPID REGURGITATION JET: 20 MMHG
ESV (BP): 27.3 CM3
FRACTIONAL SHORTENING: 38.37 %
INTERVENTRICULAR SEPTUM: 0.87 CM
LA ESV (BP): 22.4 CM3
LA ESV INDEX (A2C): 14.61 CM3/M2
LA ESV INDEX (BP): 12.44 CM3/M2
LA/AORTA RATIO: 1.04
LAAS-AP2: 11.9 CM2
LAAS-AP4: 10.2 CM2
LAD 2D: 2.7 CM
LALD A4C: 4.17 CM
LALD A4C: 4.47 CM
LAV-S: 26.3 CM3
LEFT ATRIUM VOLUME INDEX: 10 CM3/M2
LEFT ATRIUM VOLUME: 18 CM3
LEFT INTERNAL DIMENSION IN SYSTOLE: 2.57 CM (ref 2.45–3.7)
LEFT VENTRICLE DIASTOLIC VOLUME INDEX: 48.33 CM3/M2
LEFT VENTRICLE DIASTOLIC VOLUME: 87 CM3
LEFT VENTRICLE SYSTOLIC VOLUME INDEX: 15.11 CM3/M2
LEFT VENTRICLE SYSTOLIC VOLUME: 27.2 CM3
LEFT VENTRICULAR INTERNAL DIMENSION IN DIASTOLE: 4.17 CM (ref 4.13–5.73)
LEFT VENTRICULAR POSTERIOR WALL IN END DIASTOLE: 0.86 CM (ref 0.54–1.01)
LV DIASTOLIC VOLUME: 93.6 CM3
LV ESV (APICAL 2 CHAMBER): 26.1 CM3
LVAD-AP2: 29.2 CM2
LVAD-AP4: 28.3 CM2
LVAS-AP2: 14.2 CM2
LVAS-AP4: 14.1 CM2
LVEDVI(A2C): 52 CM3/M2
LVEDVI(BP): 50.39 CM3/M2
LVESVI(A2C): 14.5 CM3/M2
LVESVI(BP): 15.17 CM3/M2
LVLD-AP2: 7.77 CM
LVLD-AP4: 7.66 CM
LVLS-AP2: 6.5 CM
LVLS-AP4: 6.19 CM
LVOT 2D: 1.9 CM
LVOT A: 2.84 CM2
LVOT PEAK VELOCITY: 1.09 M/S
LVOT PG: 5 MMHG
MLH CV ECHO AVA INDEX VELOCITY RATIO: 0.9
MV E'TISSUE VEL-LAT: 0.12 M/S
MV E'TISSUE VEL-MED: 0.08 M/S
MV PEAK A VEL: 0.93 M/S
MV PEAK E VEL: 0.84 M/S
POSTERIOR WALL: 0.86 CM
PULMONARY REGURGITATION LATE DIASTOLIC VELOCITY: 0.74 M/S
RAP: 5 MMHG
RVOT VMAX: 0.63 M/S
SEPTAL TISSUE DOPPLER FREE WALL LATE DIA VELOCITY (APICAL 4 CHAMBER VIEW): 0.14 M/S
TR MAX PG: 14.9 MMHG
TRICUSPID VALVE PEAK REGURGITATION VELOCITY: 1.93 M/S
Z-SCORE OF LEFT VENTRICULAR DIMENSION IN END DIASTOLE: -1.54
Z-SCORE OF LEFT VENTRICULAR DIMENSION IN END SYSTOLE: -1.25
Z-SCORE OF LEFT VENTRICULAR POSTERIOR WALL IN END DIASTOLE: 0.8

## 2023-03-30 PROCEDURE — 93306 TTE W/DOPPLER COMPLETE: CPT | Performed by: INTERNAL MEDICINE

## 2023-03-31 ENCOUNTER — TELEPHONE (OUTPATIENT)
Dept: CARDIOLOGY | Facility: CLINIC | Age: 53
End: 2023-03-31
Payer: COMMERCIAL

## 2023-03-31 PROCEDURE — 93224 XTRNL ECG REC UP TO 48 HRS: CPT | Performed by: INTERNAL MEDICINE

## 2023-04-04 ENCOUNTER — HOSPITAL ENCOUNTER (OUTPATIENT)
Dept: RADIOLOGY | Age: 53
Discharge: HOME | End: 2023-04-04
Attending: FAMILY MEDICINE

## 2023-04-04 DIAGNOSIS — R00.2 PALPITATIONS: ICD-10-CM

## 2023-04-04 DIAGNOSIS — R07.9 CHEST PAIN, UNSPECIFIED TYPE: ICD-10-CM

## 2023-04-04 PROCEDURE — 75571 CT HRT W/O DYE W/CA TEST: CPT | Mod: MG

## 2023-04-07 ENCOUNTER — TELEPHONE (OUTPATIENT)
Dept: PRIMARY CARE | Facility: CLINIC | Age: 53
End: 2023-04-07
Payer: COMMERCIAL

## 2023-04-07 NOTE — TELEPHONE ENCOUNTER
Pt called c/o intermittent abdominal pain in the lower left abdominal region. She states it is a shooting stabbing pain slight tenderness when palpating but not painful. She can feel the pain when twisting. Denies n/v/d, decreased appetite, no lightheaded or dizziness. She did have some slight gas. She is scheduled to see Crystal HERNANDEZ on Monday at 2pm. She is aware that if symptoms worsen then to fup in the ER.

## 2023-04-07 NOTE — TELEPHONE ENCOUNTER
Patient called with complaints of sharp abdominal pain. Call transferred to [facility].

## 2023-04-10 ENCOUNTER — OFFICE VISIT (OUTPATIENT)
Dept: PRIMARY CARE | Facility: CLINIC | Age: 53
End: 2023-04-10
Payer: COMMERCIAL

## 2023-04-10 VITALS
TEMPERATURE: 97.6 F | BODY MASS INDEX: 27.81 KG/M2 | DIASTOLIC BLOOD PRESSURE: 70 MMHG | HEART RATE: 55 BPM | RESPIRATION RATE: 16 BRPM | WEIGHT: 157 LBS | SYSTOLIC BLOOD PRESSURE: 122 MMHG | OXYGEN SATURATION: 98 %

## 2023-04-10 DIAGNOSIS — R10.32 LLQ PAIN: Primary | ICD-10-CM

## 2023-04-10 PROCEDURE — 3008F BODY MASS INDEX DOCD: CPT | Performed by: NURSE PRACTITIONER

## 2023-04-10 PROCEDURE — 99213 OFFICE O/P EST LOW 20 MIN: CPT | Performed by: NURSE PRACTITIONER

## 2023-04-10 RX ORDER — CLINDAMYCIN PHOSPHATE 20 MG/G
CREAM VAGINAL
COMMUNITY
Start: 2023-04-07 | End: 2023-04-20 | Stop reason: ALTCHOICE

## 2023-04-10 ASSESSMENT — ENCOUNTER SYMPTOMS
VOMITING: 0
ABDOMINAL PAIN: 1
CARDIOVASCULAR NEGATIVE: 1
BLOOD IN STOOL: 0
ANAL BLEEDING: 0
PSYCHIATRIC NEGATIVE: 1
DIARRHEA: 0
RESPIRATORY NEGATIVE: 1
NAUSEA: 0
CONSTITUTIONAL NEGATIVE: 1
CONSTIPATION: 0
RECTAL PAIN: 0
ABDOMINAL DISTENTION: 0

## 2023-04-10 NOTE — PROGRESS NOTES
Subjective      Patient ID: Karey Kaur is a 52 y.o. female.  1970      Sharp stabbing pain in LLQ , improved over the weekend. Occasionally upper left side ,  Persistent last week   Now just an ache but it is still there   Was taking Flagyl- started Wed, took 2 doses and then 1 dose on Thursday  , and stopped it after developed a rash over her legs  Abdominal pain had started on Tuesday   When she was at urgent care , she had an internal exam   No urine specimen, no urinary issues now   Pain is now 0-1/10   Was 7/10 last week  Denies NVD, some constipation ,   Will have BM every other day , typically hard          The following have been reviewed and updated as appropriate in this visit:   Allergies  Meds  Problems       Review of Systems   Constitutional: Negative.    Respiratory: Negative.    Cardiovascular: Negative.    Gastrointestinal: Positive for abdominal pain. Negative for abdominal distention, anal bleeding, blood in stool, constipation, diarrhea, nausea, rectal pain and vomiting.   Psychiatric/Behavioral: Negative.      Patient Active Problem List   Diagnosis   • Allergic rhinitis   • LLQ pain      No past medical history on file.  Past Surgical History:   Procedure Laterality Date   • ANKLE SURGERY     •  SECTION     • CHOLECYSTECTOMY     • LASIK     • LITHOTRIPSY     • MYRINGOTOMY W/ TUBES     • TONSILLECTOMY     • TUBAL LIGATION       Social History     Socioeconomic History   • Marital status:      Spouse name: None   • Number of children: None   • Years of education: None   • Highest education level: None   Tobacco Use   • Smoking status: Never   • Smokeless tobacco: Never   Substance and Sexual Activity   • Alcohol use: Yes     Comment: Social   • Drug use: No     Objective     Vitals:    04/10/23 1301   BP: 122/70   Pulse: (!) 55   Resp: 16   Temp: 36.4 °C (97.6 °F)   SpO2: 98%   Weight: 71.2 kg (157 lb)     Body mass index is 27.81 kg/m².  Current Outpatient Medications    Medication Sig Dispense Refill   • ascorbic acid/zinc (ZINC SULFATE-VITAMIN C ORAL) Take 1 tablet by mouth daily.     • cetirizine (ZyrTEC) 10 mg tablet Take 10 mg by mouth daily.     • ciclopirox olamine (CICLOPIROX TOP) Apply topically.     • clindamycin (CLEOCIN) 2 % vaginal cream USE 1 APPLICATORFUL AT BEDTIME FOR 1 WEEK     • fluticasone (FLONASE SENSIMIST) 27.5 mcg/actuation nasal spray      • HAIR, SKIN AND NAILS, BIOTIN, ORAL      • ketoconazole (NIZORAL) 2 % cream APPLY 1 APPLICATION ON THE SKIN TWICE A DAY TO ABDOMEN X 3 WEEKS     • lidocaine 2 % solution GARGLE WITH 1 TEASPOONFUL & SIP OF WATER FOR 1 MINUTE & SPIT OUT, EVERY 3 HOURS AS NEEDED     • multivitamin with minerals (ONE DAILY 50 PLUS ORAL)      • NOT IN DATABASE Estrotone (Herbal Hormonal Balance)       No current facility-administered medications for this visit.       Physical Exam  Vitals reviewed.   Constitutional:       Appearance: Normal appearance. She is normal weight.   Cardiovascular:      Pulses: Normal pulses.      Heart sounds: Normal heart sounds.   Pulmonary:      Effort: Pulmonary effort is normal.      Breath sounds: Normal breath sounds.   Abdominal:      General: Abdomen is flat. Bowel sounds are normal. There is no distension.      Palpations: Abdomen is soft. There is no mass.      Tenderness: There is abdominal tenderness. There is no right CVA tenderness, left CVA tenderness, guarding or rebound.      Hernia: No hernia is present.      Comments: LLQ, deep palpation    Neurological:      General: No focal deficit present.      Mental Status: She is alert and oriented to person, place, and time.   Psychiatric:         Mood and Affect: Mood normal.         Behavior: Behavior normal.         Assessment/Plan     Problem List Items Addressed This Visit        Nervous    LLQ pain - Primary    Current Assessment & Plan     US pelvis and transvaginal   Urine to lab   Increase fluids   If pain worse, go to ER            Relevant  Orders    Urinalysis with Reflex Culture    US PELVIS TRANSABDOMINAL & TRANSVAGINAL       I spent 25 minutes on this date of service performing the following activities: obtaining history, performing examination, entering orders, documenting, preparing for visit and providing counseling and education.

## 2023-04-10 NOTE — PATIENT INSTRUCTIONS
Thank you for allowing me to participate in your care, it was a pleasure to care for you today. Should any questions or concerns arise, please call or message me on My Chart.       US pelvis and transvaginal   Urine to lab   Increase fluids   If pain worse, go to ER

## 2023-04-11 LAB
APPEARANCE UR: CLEAR
BACTERIA #/AREA URNS HPF: NORMAL /[HPF]
BILIRUB UR QL STRIP: NEGATIVE
CASTS URNS QL MICRO: NORMAL /LPF
COLOR UR: YELLOW
EPI CELLS #/AREA URNS HPF: NORMAL /HPF (ref 0–10)
GLUCOSE UR QL STRIP: NEGATIVE
HGB UR QL STRIP: NEGATIVE
KETONES UR QL STRIP: NEGATIVE
LAB CORP URINALYSIS REFLEX: NORMAL
LEUKOCYTE ESTERASE UR QL STRIP: NEGATIVE
MICRO URNS: NORMAL
MICRO URNS: NORMAL
NITRITE UR QL STRIP: NEGATIVE
PH UR STRIP: 6.5 [PH] (ref 5–7.5)
PROT UR QL STRIP: NEGATIVE
RBC #/AREA URNS HPF: NORMAL /HPF (ref 0–2)
SP GR UR STRIP: 1.01 (ref 1–1.03)
UROBILINOGEN UR STRIP-MCNC: 0.2 MG/DL (ref 0.2–1)
WBC #/AREA URNS HPF: NORMAL /HPF (ref 0–5)

## 2023-04-14 ENCOUNTER — TELEPHONE (OUTPATIENT)
Dept: PRIMARY CARE | Facility: CLINIC | Age: 53
End: 2023-04-14
Payer: COMMERCIAL

## 2023-04-14 NOTE — TELEPHONE ENCOUNTER
Spoke with Karey, she mentioned that in the middle of the night she was dealing with a rapid heart rate. She also mentioned that she felt weird but no pain. She has been feeling tingly. Her  checked her temp and it was 96.8. He ended up taking her to the ER early this morning. Everything came back normal. They did not recommend following up with cardiology. She does have a scheduled follow up appointment with Dr. Dumont next week and would like to discuss then. She is feeling okay overall. Did not go to work today. Yesterday she mentioned that her feet we sweating and palms felt clammy as well.

## 2023-04-14 NOTE — TELEPHONE ENCOUNTER
Please let her know saw she went back to ER today for palpitations. I do feel we need her at this time to see cardiologist so lets see if we can get her numbers to see one or help with it. Also looks like she is coming back in next wk as well

## 2023-04-20 ENCOUNTER — OFFICE VISIT (OUTPATIENT)
Dept: PRIMARY CARE | Facility: CLINIC | Age: 53
End: 2023-04-20
Payer: COMMERCIAL

## 2023-04-20 VITALS
HEIGHT: 63 IN | OXYGEN SATURATION: 97 % | WEIGHT: 154 LBS | SYSTOLIC BLOOD PRESSURE: 118 MMHG | TEMPERATURE: 97 F | DIASTOLIC BLOOD PRESSURE: 78 MMHG | HEART RATE: 70 BPM | BODY MASS INDEX: 27.29 KG/M2 | RESPIRATION RATE: 16 BRPM

## 2023-04-20 DIAGNOSIS — R00.2 PALPITATIONS: Primary | ICD-10-CM

## 2023-04-20 DIAGNOSIS — F41.9 ANXIETY: ICD-10-CM

## 2023-04-20 DIAGNOSIS — N89.8 VAGINAL DISCHARGE: ICD-10-CM

## 2023-04-20 PROBLEM — R10.32 LLQ PAIN: Status: RESOLVED | Noted: 2023-04-10 | Resolved: 2023-04-20

## 2023-04-20 PROCEDURE — 99214 OFFICE O/P EST MOD 30 MIN: CPT | Performed by: FAMILY MEDICINE

## 2023-04-20 PROCEDURE — 3008F BODY MASS INDEX DOCD: CPT | Performed by: FAMILY MEDICINE

## 2023-04-20 RX ORDER — CLINDAMYCIN PHOSPHATE 20 MG/G
CREAM VAGINAL
Qty: 40 G | Refills: 0 | Status: SHIPPED | OUTPATIENT
Start: 2023-04-20 | End: 2023-06-09

## 2023-04-20 ASSESSMENT — ENCOUNTER SYMPTOMS
FREQUENCY: 0
CONSTIPATION: 0
DIZZINESS: 0
NERVOUS/ANXIOUS: 1
BLOOD IN STOOL: 0
SORE THROAT: 0
RHINORRHEA: 0
COUGH: 0
SLEEP DISTURBANCE: 0
PALPITATIONS: 1
ABDOMINAL PAIN: 0
HEADACHES: 0
FEVER: 0
SHORTNESS OF BREATH: 0
FATIGUE: 0
ARTHRALGIAS: 0
CHILLS: 0
MYALGIAS: 0
DYSURIA: 0
HEMATURIA: 0
WHEEZING: 0

## 2023-04-20 NOTE — PROGRESS NOTES
Subjective      Patient ID: Karey Kaur is a 52 y.o. female.    She is here for hospital follow up. She was dealing with palpitations/elevated heart rates and did calcium score/echo and holter monitor which were ok. She did notice heart rate was around 114 with rest and went up to 150s with activity so has not been doing any exercise. She did see urgent care b/c was having urinary incont and told her had BV but no UTI and then got placed on flagyl but got rash from it and stopped it and rash got better but still with alittle discharge but not smelling so not sure it is gone. She is thinking about doing boric acid. She woke up out of sleep at 11:30pm on April 14th with heart raging and upper bdy feeling numbness and tingling and checked her temp and it was low around 96 and went to [place] [facility] and had more testing done which was normal. She does feel she is anxious and stressed and thinking it has to do with menopause. She does feel more irritable lately. She is concerned about feeling dehydrated and now doing vitamin drink. She is also seeing cardiologist on May 5th as well.       The following have been reviewed and updated as appropriate in this visit:   Tobacco  Allergies  Meds  Problems  Med Hx  Surg Hx  Fam Hx       Review of Systems   Constitutional: Negative for chills, fatigue and fever.   HENT: Negative for ear discharge, ear pain, postnasal drip, rhinorrhea and sore throat.    Respiratory: Negative for cough, shortness of breath and wheezing.    Cardiovascular: Positive for palpitations. Negative for chest pain.   Gastrointestinal: Negative for abdominal pain, blood in stool and constipation.   Genitourinary: Positive for vaginal discharge. Negative for dysuria, frequency and hematuria.   Musculoskeletal: Negative for arthralgias and myalgias.   Skin: Negative for rash.   Neurological: Negative for dizziness and headaches.   Psychiatric/Behavioral: Negative for sleep disturbance and  suicidal ideas. The patient is nervous/anxious.        Current Outpatient Medications   Medication Sig Dispense Refill   • ascorbic acid/zinc (ZINC SULFATE-VITAMIN C ORAL) Take 1 tablet by mouth daily.     • cetirizine (ZyrTEC) 10 mg tablet Take 10 mg by mouth as needed.     • ciclopirox olamine (CICLOPIROX TOP) Apply topically.     • clindamycin (CLEOCIN) 2 % vaginal cream USE 1 APPLICATORFUL AT BEDTIME FOR 1 WEEK 40 g 0   • fluticasone (FLONASE SENSIMIST) 27.5 mcg/actuation nasal spray      • HAIR, SKIN AND NAILS, BIOTIN, ORAL      • ketoconazole (NIZORAL) 2 % cream APPLY 1 APPLICATION ON THE SKIN TWICE A DAY TO ABDOMEN X 3 WEEKS     • multivitamin with minerals (ONE DAILY 50 PLUS ORAL)        No current facility-administered medications for this visit.     History reviewed. No pertinent past medical history.  Family History   Problem Relation Age of Onset   • Diabetes Biological Mother    • Hypertension Biological Mother    • Hyperlipidemia Biological Father    • Hypertension Biological Father    • Breast cancer Maternal Grandmother    • Aneurysm Mother's Brother    • Hypertension Half-Brother      Past Surgical History:   Procedure Laterality Date   • ANKLE SURGERY     •  SECTION     • CHOLECYSTECTOMY     • LASIK     • LITHOTRIPSY     • MYRINGOTOMY W/ TUBES     • TONSILLECTOMY     • TUBAL LIGATION       Social History     Socioeconomic History   • Marital status:      Spouse name: Not on file   • Number of children: Not on file   • Years of education: Not on file   • Highest education level: Not on file   Occupational History   • Not on file   Tobacco Use   • Smoking status: Never   • Smokeless tobacco: Never   Vaping Use   • Vaping status: Not on file   Substance and Sexual Activity   • Alcohol use: Yes     Comment: Social   • Drug use: No   • Sexual activity: Not on file   Other Topics Concern   • Not on file   Social History Narrative   • Not on file     Social Determinants of Health  "    Financial Resource Strain: Not on file   Food Insecurity: Not on file   Transportation Needs: Not on file   Physical Activity: Not on file   Stress: Not on file   Social Connections: Not on file   Intimate Partner Violence: Not on file   Housing Stability: Not on file     Allergies   Allergen Reactions   • Nut - Unspecified GI intolerance   • Cinnamon    • Doxycycline Other (see comments)     Dizziness and nausea.   • Erythromycin GI intolerance   • Lavender (Lavandula Angustifolia)    • Levofloxacin Other (see comments)   • Meperidine Hcl Itching   • Adhesive Rash   • Cephalosporins Rash   • Latex Rash   • Metronidazole Rash   • Penicillins Rash   • Sulfa (Sulfonamide Antibiotics) Rash       Objective   Visit Vitals  /78   Pulse 70   Temp 36.1 °C (97 °F) (Temporal)   Resp 16   Ht 1.6 m (5' 3\")   Wt 69.9 kg (154 lb)   LMP 12/21/2022 (Within Days)   SpO2 97%   BMI 27.28 kg/m²     Physical Exam  Vitals and nursing note reviewed.   Constitutional:       Appearance: Normal appearance.   HENT:      Right Ear: Tympanic membrane normal. There is no impacted cerumen.      Left Ear: Tympanic membrane normal. There is no impacted cerumen.      Nose: Nose normal. No rhinorrhea.      Mouth/Throat:      Mouth: Mucous membranes are moist.      Pharynx: Oropharynx is clear. No posterior oropharyngeal erythema.   Cardiovascular:      Rate and Rhythm: Normal rate and regular rhythm.      Heart sounds: No murmur heard.  Pulmonary:      Effort: Pulmonary effort is normal.      Breath sounds: Normal breath sounds. No wheezing.   Abdominal:      General: Bowel sounds are normal.      Palpations: Abdomen is soft.      Tenderness: There is no abdominal tenderness.   Musculoskeletal:         General: Normal range of motion.      Cervical back: Normal range of motion and neck supple.   Skin:     General: Skin is warm.      Findings: No rash.   Neurological:      General: No focal deficit present.      Mental Status: She is alert " and oriented to person, place, and time.      Cranial Nerves: No cranial nerve deficit.   Psychiatric:         Mood and Affect: Mood normal.         Behavior: Behavior normal.         Assessment/Plan   Problem List Items Addressed This Visit    None  Visit Diagnoses     Palpitations    -  Primary    Relevant Orders    Comprehensive metabolic panel    Magnesium    Vitamin D 25 hydroxy    Vaginal discharge        Relevant Medications    clindamycin (CLEOCIN) 2 % vaginal cream    Anxiety          She seems to feel better today with her blood pressures and heart rates. I did ask her to stop monitoring her pulse so often and will check updated labs to make sure her electrolytes are in range and she will cont to stay hydrated and can do vitamin water as needed. She will try boric acid for BV and if persists she will do another round of clinda and if not better she will need to see ob/gyn. She is going to f/u with cardiologist in early May as well. She will keep me updated on how she feels as the days progress.     Kenia Dumont, DO  4/20/2023

## 2023-04-21 LAB
25(OH)D3+25(OH)D2 SERPL-MCNC: 51.3 NG/ML (ref 30–100)
ALBUMIN SERPL-MCNC: 4.4 G/DL (ref 3.8–4.9)
ALBUMIN/GLOB SERPL: 2 {RATIO} (ref 1.2–2.2)
ALP SERPL-CCNC: 54 IU/L (ref 44–121)
ALT SERPL-CCNC: 13 IU/L (ref 0–32)
AST SERPL-CCNC: 19 IU/L (ref 0–40)
BILIRUB SERPL-MCNC: 0.3 MG/DL (ref 0–1.2)
BUN SERPL-MCNC: 10 MG/DL (ref 6–24)
BUN/CREAT SERPL: 16 (ref 9–23)
CALCIUM SERPL-MCNC: 9.5 MG/DL (ref 8.7–10.2)
CHLORIDE SERPL-SCNC: 104 MMOL/L (ref 96–106)
CO2 SERPL-SCNC: 27 MMOL/L (ref 20–29)
CREAT SERPL-MCNC: 0.64 MG/DL (ref 0.57–1)
EGFRCR SERPLBLD CKD-EPI 2021: 106 ML/MIN/1.73
GLOBULIN SER CALC-MCNC: 2.2 G/DL (ref 1.5–4.5)
GLUCOSE SERPL-MCNC: 81 MG/DL (ref 70–99)
MAGNESIUM SERPL-MCNC: 2.1 MG/DL (ref 1.6–2.3)
POTASSIUM SERPL-SCNC: 4.3 MMOL/L (ref 3.5–5.2)
PROT SERPL-MCNC: 6.6 G/DL (ref 6–8.5)
SODIUM SERPL-SCNC: 143 MMOL/L (ref 134–144)

## 2023-04-24 ENCOUNTER — HOSPITAL ENCOUNTER (OUTPATIENT)
Dept: RADIOLOGY | Age: 53
Discharge: HOME | End: 2023-04-24
Attending: NURSE PRACTITIONER
Payer: COMMERCIAL

## 2023-04-24 DIAGNOSIS — R10.32 LLQ PAIN: ICD-10-CM

## 2023-04-24 PROCEDURE — 76856 US EXAM PELVIC COMPLETE: CPT

## 2023-04-28 ENCOUNTER — HOSPITAL ENCOUNTER (OUTPATIENT)
Dept: RADIOLOGY | Facility: CLINIC | Age: 53
Discharge: HOME | End: 2023-04-28
Attending: FAMILY MEDICINE
Payer: COMMERCIAL

## 2023-04-28 DIAGNOSIS — Z13.820 SCREENING FOR OSTEOPOROSIS: ICD-10-CM

## 2023-04-28 PROCEDURE — 77080 DXA BONE DENSITY AXIAL: CPT

## 2023-05-05 ENCOUNTER — TELEPHONE (OUTPATIENT)
Dept: CARDIOLOGY | Facility: CLINIC | Age: 53
End: 2023-05-05

## 2023-05-05 ENCOUNTER — OFFICE VISIT (OUTPATIENT)
Dept: CARDIOLOGY | Facility: CLINIC | Age: 53
End: 2023-05-05
Payer: COMMERCIAL

## 2023-05-05 VITALS
HEIGHT: 63 IN | HEART RATE: 64 BPM | SYSTOLIC BLOOD PRESSURE: 124 MMHG | BODY MASS INDEX: 27.29 KG/M2 | OXYGEN SATURATION: 97 % | DIASTOLIC BLOOD PRESSURE: 80 MMHG | WEIGHT: 154 LBS | RESPIRATION RATE: 16 BRPM

## 2023-05-05 DIAGNOSIS — I65.23 BILATERAL CAROTID ARTERY STENOSIS: ICD-10-CM

## 2023-05-05 DIAGNOSIS — R00.2 PALPITATIONS: Primary | ICD-10-CM

## 2023-05-05 PROCEDURE — 93000 ELECTROCARDIOGRAM COMPLETE: CPT | Performed by: INTERNAL MEDICINE

## 2023-05-05 PROCEDURE — 3008F BODY MASS INDEX DOCD: CPT | Performed by: INTERNAL MEDICINE

## 2023-05-05 PROCEDURE — 99204 OFFICE O/P NEW MOD 45 MIN: CPT | Performed by: INTERNAL MEDICINE

## 2023-05-05 NOTE — TELEPHONE ENCOUNTER
Pre-cert Request    Name of patient: Karey Kaur    Name of physician: Marilu Rivera MD    Type of test: US carotid     Insurance: Personal Choice     Location having test done: [facility]     NPI of location: [id number]    Scheduled/Expected date for test: once auth is received

## 2023-05-05 NOTE — TELEPHONE ENCOUNTER
[facility] - US Carotid:   NPR per AIM    Please call pt to give auth info and to assist with scheduling testing

## 2023-05-05 NOTE — PROGRESS NOTES
"                     Cardiology Consult/New Patient     Patient ID: Karey Kaur 52 y.o. female. 1970  PCP: Kenia Dumont, DO   History Present Illness     HPI:       Dear Kenia,    On May 5, 2023 and met Karey Ledezma in the office for the first time at your request.  She is a 52-year-old female who felt well up until 6 weeks ago.  Randomly out of nowhere she started to become short of breath with activity.  And then about the same time she had noticed with lying in bed rapid heart rate.  Her watch identified a heart rate as high as 156.  This lasted for several hours and kept her up for a good part of the night.  Several days later she was at dinner with her  when she noticed that her heart rate once again was elevated to 168.  She had a sensation that she might pass out but did not.  She ultimately went to the emergency room and left after 12 hours.  She states that she did not see a physician during that time.  Her sense of rapid heart rate lasted for several hours.  She then saw you.    She had a coronary calcium score that was 0.  She had an echocardiogram that showed normal left ventricular function with an estimated ejection fraction of 60 to 65%.  There was no significant valve disease.  She had a trace pericardial effusion.  Her aortic valve leaflets are mildly thickened but there was no abnormality by Doppler.    She had a Holter monitor for 48 hours.  She had occasional atrial premature contractions and rare ventricular premature contractions.  During 2 symptoms of chest pain she was in sinus rhythm.  When she logged in having a high heart rate she was in sinus rhythm.    Since this work-up was completed she continues to have intermittent episodes of rapid heart rate.  The last was on April 13.  She tells me that this woke her from sleep with a sensation that her heart was racing.  She once again went to the emergency room and was told that she was \"dehydrated and given fluids.\"    She " "does not smoke.  She takes alcohol occasionally.  Her mother had hypertension.  There was no coronary disease in her family.  She works as a .  She is  with 4 children.  Her first pregnancy was associated with preeclampsia.  She did have 2 C-sections.  She is also had a tubal ligation, plates in both ankles after injuries, tubes in her ears, a cholecystectomy and kidney stones.  She has over 13 allergies many of which are to antibiotics.    She had some type of ultrasound done through her employment.  She was told that she had \"moderate carotid disease.  I do not have a copy of the official report.    Up until these episodes she was doing kickboxing and strength exercises twice daily.  She takes an little caffeine.  Her last period was 2022.  The rest of her review of systems is completely negative        Past History   History reviewed. No pertinent past medical history.  Past Surgical History:   Procedure Laterality Date   • ANKLE SURGERY     •  SECTION     • CHOLECYSTECTOMY     • LASIK     • LITHOTRIPSY     • MYRINGOTOMY W/ TUBES     • TONSILLECTOMY     • TUBAL LIGATION       Social History     Tobacco Use   • Smoking status: Never   • Smokeless tobacco: Never   Vaping Use   • Vaping status: Never Used   Substance Use Topics   • Alcohol use: Yes     Comment: Social   • Drug use: No     Family History   Problem Relation Age of Onset   • Diabetes Biological Mother    • Hypertension Biological Mother    • Hyperlipidemia Biological Father    • Hypertension Biological Father    • Breast cancer Maternal Grandmother    • Aneurysm Mother's Brother    • Hypertension Half-Brother      Allergies/Medications   Allergies: Nut - unspecified, Cinnamon, Doxycycline, Erythromycin, Lavender (lavandula angustifolia), Levofloxacin, Meperidine hcl, Adhesive, Cephalosporins, Latex, Metronidazole, Penicillins, and Sulfa (sulfonamide antibiotics)    Current Medications:   Outpatient Encounter " "Medications as of 5/5/2023:   •  cetirizine (ZyrTEC) 10 mg tablet, Take 10 mg by mouth as needed.  •  ciclopirox olamine (CICLOPIROX TOP), Apply topically as needed.  •  cranberry fruit concentrate (AZO CRANBERRY ORAL), Take by mouth.  •  fluticasone (FLONASE SENSIMIST) 27.5 mcg/actuation nasal spray, as needed for allergies.  •  HAIR, SKIN AND NAILS, BIOTIN, ORAL,   •  ketoconazole (NIZORAL) 2 % cream, Apply topically as needed.  •  MAGNESIUM ORAL, Take 125 mg by mouth.  •  multivitamin with minerals (ONE DAILY 50 PLUS ORAL),   •  ascorbic acid/zinc (ZINC SULFATE-VITAMIN C ORAL), Take 1 tablet by mouth daily.  •  clindamycin (CLEOCIN) 2 % vaginal cream, USE 1 APPLICATORFUL AT BEDTIME FOR 1 WEEK     ROS   The rest of her review of systems is completely negative  Vitals:    05/05/23 1327 05/05/23 1329   BP: 116/80 124/80   BP Location: Left upper arm Right upper arm   Patient Position: Sitting Sitting   Pulse: 64    Resp: 16    SpO2: 97%    Weight: 69.9 kg (154 lb)    Height: 1.6 m (5' 3\")      Physical Exam   Blood pressure is 116/80.  Heart rate 64 and regular.  She is comfortable.  Skin is warm and dry.  Conjunctiva are pink.  Affect is appropriate.  No JVD or HJR.  Carotids are unremarkable.  Chest is clear.  Regular rhythm.  Normal S1 and S2.  No obvious murmurs or gallops.  Abdomen is soft with good bowel sounds.  No organomegaly.  No clubbing, cyanosis, or edema.  Good peripheral pulses.  No rash.  No obvious musculoskeletal deformities.  Labs/ Imaging/Procedures   Labs  Lab Results   Component Value Date    CHOL 183 03/23/2023    CHOL 175 11/30/2022     Lab Results   Component Value Date    HDL 76 03/23/2023    HDL 81 11/30/2022     Lab Results   Component Value Date    LDLCALC 98 03/23/2023    LDLCALC 85 11/30/2022     Lab Results   Component Value Date    TRIG 42 03/23/2023    TRIG 41 11/30/2022     Lab Results   Component Value Date    WBC 6.3 03/23/2023    HGB 14.5 03/23/2023    HCT 43.9 03/23/2023    MCV " 94 03/23/2023     03/23/2023     Lab Results   Component Value Date    GLUCOSE 81 04/20/2023     04/20/2023    K 4.3 04/20/2023    CO2 27 04/20/2023     04/20/2023    BUN 10 04/20/2023    CREATININE 0.64 04/20/2023     Lab Results   Component Value Date    HGBA1C 5.5 03/23/2023         EKG  EKG is within normal limits  Assessment/Plan     Problem List Items Addressed This Visit        Circulatory    Palpitations - Primary    Relevant Orders    ECG 12 lead (Completed)       Impression:     It is unclear as to why Don is having such symptoms despite reasonably normal studies.  Certainly we can assess her rhythm with a monitor that she can wear longer.  Another option is to empirically try some beta-blockade.  In some people this helps the sensation of a pounding heart even if there heart rates are reasonable.      Some of her symptoms could certainly be related to menopause.  At this point in time she would like to to continue to observe and use no treatment.  Certainly we can always readdress this if her symptoms worsen.  I have encouraged her to get back to her physical activity.    In regard to her potential carotid disease we will doing carotid ultrasounds to reevaluate the situation.      Thank you for allowing me to participate in the care of this patient.  I hope this information is helpful.  Please do not hesitate to contact me with any questions or concerns.      Marilu Rivera MD [facility], FACP  5/5/2023

## 2023-05-05 NOTE — LETTER
May 6, 2023     Kenia Dumont DO  [address]  [facility] [address]    Patient: Karey Kaur  YOB: 1970  Date of Visit: 5/5/2023      Dear Dr. Dumont:    Thank you for referring Karey Kaur to me for evaluation. Below are my notes for this consultation.    If you have questions, please do not hesitate to call me. I look forward to following your patient along with you.         Sincerely,        Marilu Rivera MD        CC: No Recipients    Marilu Rivera MD  5/6/2023  2:55 PM  Signed                       Cardiology Consult/New Patient     Patient ID: Karey Kaur 52 y.o. female. 1970  PCP: Kenia Dumont DO   History Present Illness     HPI:       Dear Kenia,    On May 5, 2023 and met Karey Ledezma in the office for the first time at your request.  She is a 52-year-old female who felt well up until 6 weeks ago.  Randomly out of nowhere she started to become short of breath with activity.  And then about the same time she had noticed with lying in bed rapid heart rate.  Her watch identified a heart rate as high as 156.  This lasted for several hours and kept her up for a good part of the night.  Several days later she was at dinner with her  when she noticed that her heart rate once again was elevated to 168.  She had a sensation that she might pass out but did not.  She ultimately went to the emergency room and left after 12 hours.  She states that she did not see a physician during that time.  Her sense of rapid heart rate lasted for several hours.  She then saw you.    She had a coronary calcium score that was 0.  She had an echocardiogram that showed normal left ventricular function with an estimated ejection fraction of 60 to 65%.  There was no significant valve disease.  She had a trace pericardial effusion.  Her aortic valve leaflets are mildly thickened but there was no abnormality by Doppler.    She had a Holter monitor for 48 hours.   "She had occasional atrial premature contractions and rare ventricular premature contractions.  During 2 symptoms of chest pain she was in sinus rhythm.  When she logged in having a high heart rate she was in sinus rhythm.    Since this work-up was completed she continues to have intermittent episodes of rapid heart rate.  The last was on .  She tells me that this woke her from sleep with a sensation that her heart was racing.  She once again went to the emergency room and was told that she was \"dehydrated and given fluids.\"    She does not smoke.  She takes alcohol occasionally.  Her mother had hypertension.  There was no coronary disease in her family.  She works as a .  She is  with 4 children.  Her first pregnancy was associated with preeclampsia.  She did have 2 C-sections.  She is also had a tubal ligation, plates in both ankles after injuries, tubes in her ears, a cholecystectomy and kidney stones.  She has over 13 allergies many of which are to antibiotics.    She had some type of ultrasound done through her employment.  She was told that she had \"moderate carotid disease.  I do not have a copy of the official report.    Up until these episodes she was doing kickboxing and strength exercises twice daily.  She takes an little caffeine.  Her last period was 2022.  The rest of her review of systems is completely negative        Past History   History reviewed. No pertinent past medical history.  Past Surgical History:   Procedure Laterality Date   • ANKLE SURGERY     •  SECTION     • CHOLECYSTECTOMY     • LASIK     • LITHOTRIPSY     • MYRINGOTOMY W/ TUBES     • TONSILLECTOMY     • TUBAL LIGATION       Social History     Tobacco Use   • Smoking status: Never   • Smokeless tobacco: Never   Vaping Use   • Vaping status: Never Used   Substance Use Topics   • Alcohol use: Yes     Comment: Social   • Drug use: No     Family History   Problem Relation Age of Onset   • " "Diabetes Biological Mother    • Hypertension Biological Mother    • Hyperlipidemia Biological Father    • Hypertension Biological Father    • Breast cancer Maternal Grandmother    • Aneurysm Mother's Brother    • Hypertension Half-Brother      Allergies/Medications   Allergies: Nut - unspecified, Cinnamon, Doxycycline, Erythromycin, Lavender (lavandula angustifolia), Levofloxacin, Meperidine hcl, Adhesive, Cephalosporins, Latex, Metronidazole, Penicillins, and Sulfa (sulfonamide antibiotics)    Current Medications:   Outpatient Encounter Medications as of 5/5/2023:   •  cetirizine (ZyrTEC) 10 mg tablet, Take 10 mg by mouth as needed.  •  ciclopirox olamine (CICLOPIROX TOP), Apply topically as needed.  •  cranberry fruit concentrate (AZO CRANBERRY ORAL), Take by mouth.  •  fluticasone (FLONASE SENSIMIST) 27.5 mcg/actuation nasal spray, as needed for allergies.  •  HAIR, SKIN AND NAILS, BIOTIN, ORAL,   •  ketoconazole (NIZORAL) 2 % cream, Apply topically as needed.  •  MAGNESIUM ORAL, Take 125 mg by mouth.  •  multivitamin with minerals (ONE DAILY 50 PLUS ORAL),   •  ascorbic acid/zinc (ZINC SULFATE-VITAMIN C ORAL), Take 1 tablet by mouth daily.  •  clindamycin (CLEOCIN) 2 % vaginal cream, USE 1 APPLICATORFUL AT BEDTIME FOR 1 WEEK     ROS   The rest of her review of systems is completely negative  Vitals:    05/05/23 1327 05/05/23 1329   BP: 116/80 124/80   BP Location: Left upper arm Right upper arm   Patient Position: Sitting Sitting   Pulse: 64    Resp: 16    SpO2: 97%    Weight: 69.9 kg (154 lb)    Height: 1.6 m (5' 3\")      Physical Exam   Blood pressure is 116/80.  Heart rate 64 and regular.  She is comfortable.  Skin is warm and dry.  Conjunctiva are pink.  Affect is appropriate.  No JVD or HJR.  Carotids are unremarkable.  Chest is clear.  Regular rhythm.  Normal S1 and S2.  No obvious murmurs or gallops.  Abdomen is soft with good bowel sounds.  No organomegaly.  No clubbing, cyanosis, or edema.  Good " peripheral pulses.  No rash.  No obvious musculoskeletal deformities.  Labs/ Imaging/Procedures   Labs  Lab Results   Component Value Date    CHOL 183 03/23/2023    CHOL 175 11/30/2022     Lab Results   Component Value Date    HDL 76 03/23/2023    HDL 81 11/30/2022     Lab Results   Component Value Date    LDLCALC 98 03/23/2023    LDLCALC 85 11/30/2022     Lab Results   Component Value Date    TRIG 42 03/23/2023    TRIG 41 11/30/2022     Lab Results   Component Value Date    WBC 6.3 03/23/2023    HGB 14.5 03/23/2023    HCT 43.9 03/23/2023    MCV 94 03/23/2023     03/23/2023     Lab Results   Component Value Date    GLUCOSE 81 04/20/2023     04/20/2023    K 4.3 04/20/2023    CO2 27 04/20/2023     04/20/2023    BUN 10 04/20/2023    CREATININE 0.64 04/20/2023     Lab Results   Component Value Date    HGBA1C 5.5 03/23/2023         EKG  EKG is within normal limits  Assessment/Plan     Problem List Items Addressed This Visit        Circulatory    Palpitations - Primary    Relevant Orders    ECG 12 lead (Completed)       Impression:     It is unclear as to why Don is having such symptoms despite reasonably normal studies.  Certainly we can assess her rhythm with a monitor that she can wear longer.  Another option is to empirically try some beta-blockade.  In some people this helps the sensation of a pounding heart even if there heart rates are reasonable.      Some of her symptoms could certainly be related to menopause.  At this point in time she would like to to continue to observe and use no treatment.  Certainly we can always readdress this if her symptoms worsen.  I have encouraged her to get back to her physical activity.    In regard to her potential carotid disease we will doing carotid ultrasounds to reevaluate the situation.      Thank you for allowing me to participate in the care of this patient.  I hope this information is helpful.  Please do not hesitate to contact me with any questions or  concerns.      Marilu Rivera MD FACC, FACP  5/5/2023

## 2023-05-06 PROBLEM — I65.23 BILATERAL CAROTID ARTERY STENOSIS: Status: ACTIVE | Noted: 2023-05-06

## 2023-05-08 ENCOUNTER — TELEPHONE (OUTPATIENT)
Dept: CARDIOLOGY | Facility: CLINIC | Age: 53
End: 2023-05-08
Payer: COMMERCIAL

## 2023-05-08 DIAGNOSIS — R07.9 CHEST PAIN, UNSPECIFIED TYPE: Primary | ICD-10-CM

## 2023-05-08 DIAGNOSIS — I25.118 CORONARY ARTERY DISEASE WITH STABLE ANGINA PECTORIS, UNSPECIFIED VESSEL OR LESION TYPE, UNSPECIFIED WHETHER NATIVE OR TRANSPLANTED HEART (CMS/HCC): ICD-10-CM

## 2023-05-08 NOTE — TELEPHONE ENCOUNTER
----- Message from Karey Kaur sent at 5/6/2023  5:15 PM EDT -----  Regarding: Chest Pain  Contact: [phone]  I wanted to let you know that I experienced something today and it was the first this has happened to me.    I awoke around 8:30 a.m. to go to the bathroom and experienced a very heavy feeling in the center of my chest like I had a weight on it.  With that it was difficult to take any deep breaths.    I laid down on the bed and relaxed my body and the pain went away within about 5 minutes.    It was scary and concerning given the recent symptoms I recently had.  Is this something I need be concerned about?    Thank you for your time.

## 2023-05-08 NOTE — TELEPHONE ENCOUNTER
Pre-cert Request    Name of patient: Karey Kaur    Name of physician: Marilu Rivera MD    Type of test: Coronary CTA    Insurance: Personal Choice  ID: [id number]    Location having test done: [facility] of location: [id number]    Scheduled/Expected date for test: upon auth received

## 2023-05-08 NOTE — TELEPHONE ENCOUNTER
On May 8, 2023 I spoke with Karey.  2 days ago she woke in the middle of the night and developed chest discomfort that lasted for approximately 5 minutes.  It felt as though something was sitting on her chest.  It was associated with shortness of breath.  It has not recurred.    She will be having a coronary artery CTA.  If in fact she does have true rotted disease, she certainly may have coronary disease.  This study will help us evaluate the extent of any potential coronary disease.

## 2023-05-10 ENCOUNTER — HOSPITAL ENCOUNTER (OUTPATIENT)
Dept: CARDIOLOGY | Age: 53
Discharge: HOME | End: 2023-05-10
Attending: INTERNAL MEDICINE
Payer: COMMERCIAL

## 2023-05-10 DIAGNOSIS — I65.23 BILATERAL CAROTID ARTERY STENOSIS: ICD-10-CM

## 2023-05-10 PROCEDURE — 93880 EXTRACRANIAL BILAT STUDY: CPT

## 2023-05-10 NOTE — TELEPHONE ENCOUNTER
MERE/ Kavon  Auth# [id number]        05/10/2023-08/07/2023    Please provide the pt the auth # and assist with scheduling testing

## 2023-05-10 NOTE — TELEPHONE ENCOUNTER
Spoke w/ Pt and provided the following information:    MERE/ Kavon  Auth# [id number]        05/10/2023-08/07/2023    Call [phone] to schedule test.

## 2023-05-11 ENCOUNTER — TELEPHONE (OUTPATIENT)
Dept: CARDIOLOGY | Facility: CLINIC | Age: 53
End: 2023-05-11
Payer: COMMERCIAL

## 2023-05-11 LAB
LEFT CCA DIST DIAS: 32.1 CM/S
LEFT CCA DIST SYS: 103 CM/S
LEFT CCA MID DIAS: 29 CM/S
LEFT CCA MID SYS: 99.6 CM/S
LEFT CCA PROX DIAS: 27.4 CM/S
LEFT CCA PROX SYS: 99.6 CM/S
LEFT ICA DIST DIAS: 39.2 CM/S
LEFT ICA DIST SYS: 91.1 CM/S
LEFT ICA MID DIAS: 20.3 CM/S
LEFT ICA MID SYS: 63.8 CM/S
LEFT ICA PROX DIAS: 25.1 CM/S
LEFT ICA PROX SYS: 77.6 CM/S
LEFT ICA/CCA SYS: 0.88
LEFT SUBCLAVIAN SYS: 44.5 CM/S
LEFT VERTEBRAL SYS: 45.2 CM/S
LT ECA PROX EDV: 23.5 CM/S
LT ECA PROX PSV: 98 CM/S
LT SUBCLAVIAN PROX PSV: 44.5 CM/S
LT VERTEBRAL PROX EDV: 11.4 CM/S
LT VERTEBRAL PROX PSV: 45.2 CM/S
RIGHT CCA DIST DIAS: 34.5 CM/S
RIGHT CCA DIST SYS: 130 CM/S
RIGHT CCA MID DIAS: 35.3 CM/S
RIGHT CCA MID SYS: 129 CM/S
RIGHT CCA PROX DIAS: 29.8 CM/S
RIGHT CCA PROX SYS: 104 CM/S
RIGHT ECA SYS: 100 CM/S
RIGHT ICA DIST DIAS: 33.7 CM/S
RIGHT ICA DIST SYS: 81.5 CM/S
RIGHT ICA MID DIAS: 32.1 CM/S
RIGHT ICA MID SYS: 90.2 CM/S
RIGHT ICA PROX DIAS: 25.1 CM/S
RIGHT ICA PROX SYS: 103 CM/S
RIGHT ICA/CCA SYS: 0.79
RIGHT SUBCLAVIAN SYS: 45.5 CM/S
RIGHT VA 1 EDV: 24.3 CM/S
RIGHT VA 1 MEAN: 37.37 CM/S
RIGHT VA 1 PI: 1.05
RIGHT VA 1 PSV: 63.5 CM/S
RIGHT VA 1 RI: 0.62
RIGHT VA 1 S/D RATIO: 2.61
RIGHT VERTEBRAL SYS: 63.5 CM/S
RT ECA PROC EDV: 25.9 CM/S
RT SUBCLAVIAN PROX PSV: 45.5 CM/S
RT VERTEBRAL PROX EDV: 24.3 CM/S

## 2023-05-11 NOTE — TELEPHONE ENCOUNTER
May 11, 2023 I spoke with Karey about her carotid studies.  There was no significant plaque noted.  Coronary artery CTA is pending

## 2023-06-15 ENCOUNTER — TELEPHONE (OUTPATIENT)
Dept: RADIOLOGY | Facility: HOSPITAL | Age: 53
End: 2023-06-15
Payer: COMMERCIAL

## 2023-06-16 NOTE — TELEPHONE ENCOUNTER
Unable to reach patient or spouse to provide instructions for scheduled Cardiac CTA on 6- with arrival time of 0900.

## 2023-07-19 ENCOUNTER — TELEPHONE (OUTPATIENT)
Dept: RADIOLOGY | Facility: HOSPITAL | Age: 53
End: 2023-07-19
Payer: COMMERCIAL

## 2023-07-20 LAB
BUN SERPL-MCNC: 11 MG/DL (ref 6–24)
CREAT SERPL-MCNC: 0.62 MG/DL (ref 0.57–1)
EGFRCR SERPLBLD CKD-EPI 2021: 106 ML/MIN/1.73

## 2023-07-24 ENCOUNTER — HOSPITAL ENCOUNTER (OUTPATIENT)
Dept: RADIOLOGY | Facility: HOSPITAL | Age: 53
Discharge: HOME | End: 2023-07-24
Attending: INTERNAL MEDICINE
Payer: COMMERCIAL

## 2023-07-24 VITALS
OXYGEN SATURATION: 100 % | HEART RATE: 59 BPM | RESPIRATION RATE: 18 BRPM | WEIGHT: 160 LBS | SYSTOLIC BLOOD PRESSURE: 131 MMHG | BODY MASS INDEX: 28.35 KG/M2 | HEIGHT: 63 IN | DIASTOLIC BLOOD PRESSURE: 68 MMHG

## 2023-07-24 DIAGNOSIS — I25.118 CORONARY ARTERY DISEASE WITH STABLE ANGINA PECTORIS, UNSPECIFIED VESSEL OR LESION TYPE, UNSPECIFIED WHETHER NATIVE OR TRANSPLANTED HEART (CMS/HCC): ICD-10-CM

## 2023-07-24 DIAGNOSIS — R07.9 CHEST PAIN, UNSPECIFIED TYPE: ICD-10-CM

## 2023-07-24 PROCEDURE — 63600105 HC IODINE BASED CONTRAST: Mod: JZ

## 2023-07-24 PROCEDURE — 63700000 HC SELF-ADMINISTRABLE DRUG

## 2023-07-24 PROCEDURE — 75574 CT ANGIO HRT W/3D IMAGE: CPT | Mod: ME

## 2023-07-24 RX ORDER — NITROGLYCERIN 0.4 MG/1
0.4 TABLET SUBLINGUAL ONCE
Status: COMPLETED | OUTPATIENT
Start: 2023-07-24 | End: 2023-07-24

## 2023-07-24 RX ADMIN — NITROGLYCERIN 0.4 MG: 0.4 TABLET SUBLINGUAL at 12:53

## 2023-07-24 RX ADMIN — IOHEXOL 100 ML: 350 INJECTION, SOLUTION INTRAVENOUS at 13:09

## 2023-07-24 NOTE — PROGRESS NOTES
Staff present during Radiology Nurse encounter:    Nurse: NICKIE Jaramillo RN  Technologist: CARMEN Kumar    Cardiologist: Dr M Valentino  Other: 20g left acf piv, Ntg 0.4mg sl given Contrast 100ml Prospective study.  Cardiac CTA Worksheet    Chest pain (symptomatic patients):  Intermediate pre-test probability of Coronary Artery Disease        nitroglycerin (NITROSTAT) .4mg

## 2023-07-24 NOTE — DISCHARGE INSTRUCTIONS
Karey LAMBERT [place]   [id number]   07/24/23    Cardiac CTA Patient Discharge Instructions      Thank you for allowing our CT staff to participate in your care today.  We would like to provide you with some easy to follow instructions before you leave.    DRINK PLENTY OF FLUIDS  Now that your scan is complete, you will need to drink plenty of fluids, preferably water.    DO NOT drink any caffeinated beverages the rest of the day (coffee, tea, caffeinated sodas).    DO NOT drink any alcoholic beverages for the next 24 hours.  We ask you to drink these fluids to dilute the x-ray dye that was used for your scan and allow it to flush through your kidneys.  Caffeine and alcohol will not allow this flushing to occur effectively.       MEDICATION CHANGES:  {YES/NO/WILD CARDS:52455}    If you have any questions about this, please contact your primary care physician.    If you need immediate medical attention, please go to the nearest Emergency Department or dial 911.    By signing this form, I acknowledge these instructions have been explained to me to my satisfaction and all my questions have been answered.  I have received a copy of this form.

## 2023-07-25 ENCOUNTER — TELEPHONE (OUTPATIENT)
Dept: CARDIOLOGY | Facility: CLINIC | Age: 53
End: 2023-07-25
Payer: COMMERCIAL

## 2023-07-25 NOTE — TELEPHONE ENCOUNTER
On July 25, 2023 I spoke with jose angel about her coronary artery calcium score.  I am happy to say that it is 0.  She understands that she does not have any significant carotid disease or coronary artery disease.  Her palpitations have significantly improved.  At this point time she does not want to pursue any other testing for these and will call me if they become a bigger issue.  I will discharge her back to her primary care physician

## 2023-12-22 ENCOUNTER — OFFICE VISIT (OUTPATIENT)
Dept: PRIMARY CARE | Facility: CLINIC | Age: 53
End: 2023-12-22
Payer: COMMERCIAL

## 2023-12-22 VITALS
OXYGEN SATURATION: 96 % | SYSTOLIC BLOOD PRESSURE: 116 MMHG | HEART RATE: 68 BPM | BODY MASS INDEX: 29.73 KG/M2 | HEIGHT: 63 IN | DIASTOLIC BLOOD PRESSURE: 80 MMHG | TEMPERATURE: 98 F | WEIGHT: 167.8 LBS

## 2023-12-22 DIAGNOSIS — R23.9 SKIN CHANGE: ICD-10-CM

## 2023-12-22 DIAGNOSIS — J06.9 VIRAL URI: Primary | ICD-10-CM

## 2023-12-22 PROCEDURE — 99214 OFFICE O/P EST MOD 30 MIN: CPT | Performed by: NURSE PRACTITIONER

## 2023-12-22 PROCEDURE — 3008F BODY MASS INDEX DOCD: CPT | Performed by: NURSE PRACTITIONER

## 2023-12-22 ASSESSMENT — ENCOUNTER SYMPTOMS
TROUBLE SWALLOWING: 0
LIGHT-HEADEDNESS: 0
CHEST TIGHTNESS: 0
SINUS PRESSURE: 0
SINUS PAIN: 0
DIZZINESS: 0
HEADACHES: 0
SHORTNESS OF BREATH: 0
WHEEZING: 0
PSYCHIATRIC NEGATIVE: 1
CHOKING: 0
CARDIOVASCULAR NEGATIVE: 1
APNEA: 0
RHINORRHEA: 0
STRIDOR: 0
CONSTITUTIONAL NEGATIVE: 1
COUGH: 1
SORE THROAT: 0

## 2023-12-22 NOTE — ASSESSMENT & PLAN NOTE
Increase fluids   Rest   Tylenol or Ibuprofen as needed for body aches/fevers   Flonase and zyrtec daily   Watch skin change and let me know if it worsens   Nasal saline for congestion as needed   Contact us if you are no better or worse - strict ER precautions discussed

## 2023-12-22 NOTE — PROGRESS NOTES
[facility]   [address], [address]  [place] [address]  Phone: [phone]  Fax:[phone]        Subjective      Patient ID: Karey Kaur is a 53 y.o. female.  1970      Cough for the last week   Started having a L earache as well   Very congested   Fever last week , 99  Cough , occasional   Getting better   Fine during the day   At night PND , coughing is worse   This am has been using VICKS and helped  Runny nose stopped   Using nasal saline   Flying next week       Sore spot on  R neck as well   Tender , improving some but wanted to make sure it was not shingles         The following have been reviewed and updated as appropriate in this visit:   Allergies  Meds  Problems       Review of Systems   Constitutional: Negative.    HENT: Positive for congestion and ear pain. Negative for ear discharge, postnasal drip, rhinorrhea, sinus pressure, sinus pain, sore throat and trouble swallowing.    Respiratory: Positive for cough. Negative for apnea, choking, chest tightness, shortness of breath, wheezing and stridor.    Cardiovascular: Negative.    Neurological: Negative for dizziness, light-headedness and headaches.   Psychiatric/Behavioral: Negative.      Patient Active Problem List   Diagnosis   • Allergic rhinitis   • Palpitations   • Bilateral carotid artery stenosis   • Viral URI   • Skin change      Past Medical History:   Diagnosis Date   • Allergic    • GERD (gastroesophageal reflux disease)      Past Surgical History:   Procedure Laterality Date   • ANKLE SURGERY     •  SECTION     • CHOLECYSTECTOMY     • LASIK     • LITHOTRIPSY     • MYRINGOTOMY W/ TUBES     • TONSILLECTOMY     • TUBAL LIGATION       Social History     Socioeconomic History   • Marital status:      Spouse name: None   • Number of children: None   • Years of education: None   • Highest education level: None   Tobacco Use   • Smoking status: Never   • Smokeless tobacco:  "Never   Vaping Use   • Vaping Use: Never used   Substance and Sexual Activity   • Alcohol use: Yes     Comment: Social   • Drug use: No   • Sexual activity: Never     Objective     Vitals:    12/22/23 1444 12/22/23 1446   BP:  116/80   BP Location:  Left upper arm   Patient Position:  Sitting   Pulse:  68   Temp:  36.7 °C (98 °F)   TempSrc:  Temporal   SpO2:  96%   Weight: 76.1 kg (167 lb 12.8 oz) 76.1 kg (167 lb 12.8 oz)   Height: 1.6 m (5' 3\") 1.6 m (5' 3\")     Body mass index is 29.72 kg/m².  Current Outpatient Medications   Medication Sig Dispense Refill   • cetirizine (ZyrTEC) 10 mg tablet Take 10 mg by mouth as needed.     • fluticasone (FLONASE SENSIMIST) 27.5 mcg/actuation nasal spray as needed for allergies.     • HAIR, SKIN AND NAILS, BIOTIN, ORAL      • MAGNESIUM ORAL Take 125 mg by mouth.     • multivitamin with minerals (ONE DAILY 50 PLUS ORAL)      • ascorbic acid/zinc (ZINC SULFATE-VITAMIN C ORAL) Take 1 tablet by mouth daily.     • ciclopirox olamine (CICLOPIROX TOP) Apply topically as needed.     • cranberry fruit concentrate (AZO CRANBERRY ORAL) Take by mouth.     • ketoconazole (NIZORAL) 2 % cream Apply topically as needed.       No current facility-administered medications for this visit.       Physical Exam  Vitals reviewed.   Constitutional:       Appearance: Normal appearance. She is normal weight.   HENT:      Right Ear: Tympanic membrane, ear canal and external ear normal.      Left Ear: Ear canal and external ear normal. No swelling or tenderness. A middle ear effusion is present. There is no impacted cerumen. Tympanic membrane is not injected, perforated, erythematous, retracted or bulging.      Nose: Nose normal.      Mouth/Throat:      Mouth: Mucous membranes are moist.      Pharynx: Oropharynx is clear.   Cardiovascular:      Rate and Rhythm: Normal rate and regular rhythm.      Pulses: Normal pulses.      Heart sounds: Normal heart sounds.   Pulmonary:      Effort: Pulmonary effort is " normal.      Breath sounds: Normal breath sounds.   Musculoskeletal:      Cervical back: Neck supple.   Skin:     General: Skin is warm and dry.      Comments: small scabbed area on R side of neck, less than 0.25 cm   No redness or drainage    Neurological:      Mental Status: She is alert and oriented to person, place, and time. Mental status is at baseline.   Psychiatric:         Mood and Affect: Mood normal.         Behavior: Behavior normal.         Thought Content: Thought content normal.         Assessment/Plan     Diagnoses and all orders for this visit:    Viral URI (Primary)  Assessment & Plan:    Increase fluids   Rest   Tylenol or Ibuprofen as needed for body aches/fevers   Flonase and zyrtec daily   Watch skin change and let me know if it worsens   Nasal saline for congestion as needed   Contact us if you are no better or worse - strict ER precautions discussed       Skin change  Assessment & Plan:    Watch skin change and let me know if it worsens              MARY Bailey   12/22/2023

## 2023-12-22 NOTE — PATIENT INSTRUCTIONS
Thank you for allowing me to participate in your care, it was a pleasure to care for you today. Should any questions or concerns arise, please call or message me on My Chart.     Increase fluids   Rest   Tylenol or Ibuprofen as needed for body aches/fevers   Flonase and zyrtec daily   Watch skin change and let me know if it worsens   Nasal saline for congestion as needed   Contact us if you are no better or worse - strict ER precautions discussed

## 2024-02-23 PROCEDURE — 99281 EMR DPT VST MAYX REQ PHY/QHP: CPT

## 2024-02-24 ENCOUNTER — HOSPITAL ENCOUNTER (EMERGENCY)
Facility: HOSPITAL | Age: 54
Discharge: HOME | End: 2024-02-24
Attending: EMERGENCY MEDICINE
Payer: COMMERCIAL

## 2024-02-24 VITALS
RESPIRATION RATE: 18 BRPM | HEART RATE: 81 BPM | BODY MASS INDEX: 30.12 KG/M2 | OXYGEN SATURATION: 98 % | TEMPERATURE: 97.8 F | WEIGHT: 170 LBS | DIASTOLIC BLOOD PRESSURE: 89 MMHG | SYSTOLIC BLOOD PRESSURE: 144 MMHG | HEIGHT: 63 IN

## 2024-02-24 DIAGNOSIS — R21 RASH: Primary | ICD-10-CM

## 2024-02-24 RX ORDER — METHYLPREDNISOLONE 4 MG/1
TABLET ORAL
Qty: 21 TABLET | Refills: 0 | Status: SHIPPED | OUTPATIENT
Start: 2024-02-24 | End: 2024-03-02

## 2024-02-24 NOTE — ED PROVIDER NOTES
HPI    Chief Complaint   Patient presents with   • Rash        HPI   Patient is 53-year-old female with past medical history listed below presenting for evaluation of a rash that is generalized mostly on her abdomen, back, behind her knees.  She started with that yesterday as she was in [facility] and notes that she was in the sun a good portion of the day yesterday.  Itching worsened today.  She denies any new soaps, lotions, sunscreen but does note having used the towels that the resort they were staying out.  She denies any shortness of breath, sore throat, airway compromise.  Denies any coughing, runny nose, nausea, vomiting, fevers.  She has been taking Zyrtec since yesterday for her symptoms.      Past Medical History:   Diagnosis Date   • Allergic    • GERD (gastroesophageal reflux disease)          Past Surgical History:   Procedure Laterality Date   • ANKLE SURGERY     •  SECTION     • CHOLECYSTECTOMY     • LASIK     • LITHOTRIPSY     • MYRINGOTOMY W/ TUBES     • TONSILLECTOMY     • TUBAL LIGATION         Family History   Problem Relation Age of Onset   • Diabetes Biological Mother    • Hypertension Biological Mother    • Hyperlipidemia Biological Father    • Hypertension Biological Father    • Breast cancer Maternal Grandmother    • Aneurysm Mother's Brother    • Hypertension Half-Brother        Social History     Tobacco Use   • Smoking status: Never   • Smokeless tobacco: Never   Vaping Use   • Vaping Use: Never used   Substance Use Topics   • Alcohol use: Yes     Comment: Social   • Drug use: No       Systems Reviewed from Nursing Triage:               Review of Systems         ED Triage Vitals [24 0010]   Temp Heart Rate Resp BP SpO2   36.6 °C (97.8 °F) 81 18 (!) 144/89 98 %      Temp Source Heart Rate Source Patient Position BP Location FiO2 (%) (Set)   Temporal Monitor Sitting Right upper arm --       Vitals:    24 0010   BP: (!) 144/89   BP Location: Right upper arm   Patient  "Position: Sitting   Pulse: 81   Resp: 18   Temp: 36.6 °C (97.8 °F)   TempSrc: Temporal   SpO2: 98%   Weight: 77.1 kg (170 lb)   Height: 1.6 m (5' 3\")                         Physical Exam  Vitals and nursing note reviewed.   Constitutional:       Appearance: She is well-developed.   HENT:      Head: Normocephalic and atraumatic.   Cardiovascular:      Rate and Rhythm: Normal rate.      Pulses: Normal pulses.   Pulmonary:      Effort: Pulmonary effort is normal.   Musculoskeletal:      Cervical back: Normal range of motion.   Skin:     Capillary Refill: Capillary refill takes less than 2 seconds.      Comments: What appears to be sunburn, blanchable redness on her torso, back, bilateral upper and lower extremities that is blanchable.  There are noted very small bumps noted in patient's bilateral popliteal fossas.   Neurological:      Mental Status: She is alert and oriented to person, place, and time. Mental status is at baseline.   Psychiatric:         Mood and Affect: Mood normal.         Behavior: Behavior normal.              No orders to display       Labs Reviewed - No data to display    No orders to display         Procedures    Final diagnoses:   [R21] Rash       ED Course & MDM              Medical Decision Making  Rash: acute illness or injury  Amount and/or Complexity of Data Reviewed  External Data Reviewed: labs and notes.      Risk  Prescription drug management.          3:54 AM      Impression/Medical Decision Making: Rash developed yesterday    Plan: Discharge    Vital Signs Review: Vital signs have been reviewed. The oxygen saturation is  SpO2: 98 % which is normal.      MARY Marroquin  3/7/2024      We are in a period of time with increased volumes and decreased capacity.     This document was created using dragon dictation software.  There might be some typographical errors due to this technology.     Doris Cheney CRNP  03/07/24 0354    "

## 2024-03-03 NOTE — ED ATTESTATION NOTE
I have personally seen and examined Karey Kaur.  I was involved in the care and medical decision making for this patient.    I reviewed and agree with physician assistant / nurse practitioner’s assessment and plan of care; any exceptions are documented below.      My focused history, examination, assessment and plan of care of Karey Kaur is as follows:    Brief History:  HPI  54 yo F with hx of GERD, multiple allergies p/w widespread itchy red rash to her abdomen, BL legs (behind knees) and across her back. Recently returned from a trip to [place] yesterday. Reports rash began before she left [place]. Has become more itchy recently. No SOB/ wheezing. No nausea/ vomiting. No fevers. No known new allergen exposures/ lotions/soaps as pt is known to have sensitive skin and avoids these things. No known new meds. No oral lesions.       Focused Physical Exam:  Physical Exam  Vitals and nursing note reviewed.   Constitutional:       Appearance: Normal appearance. She is normal weight. She is not toxic-appearing.   HENT:      Head: Normocephalic and atraumatic.      Nose: Nose normal.      Mouth/Throat:      Mouth: Mucous membranes are moist.      Pharynx: No oropharyngeal exudate or posterior oropharyngeal erythema.   Eyes:      Extraocular Movements: Extraocular movements intact.      Pupils: Pupils are equal, round, and reactive to light.   Cardiovascular:      Rate and Rhythm: Normal rate and regular rhythm.      Pulses: Normal pulses.      Heart sounds: Normal heart sounds.   Pulmonary:      Effort: Pulmonary effort is normal. No respiratory distress.      Breath sounds: Normal breath sounds. No wheezing.   Abdominal:      Palpations: Abdomen is soft.      Tenderness: There is no abdominal tenderness. There is no guarding or rebound.   Musculoskeletal:         General: Normal range of motion.      Cervical back: Normal range of motion and neck supple.   Skin:     General: Skin is warm and dry.      Capillary  Refill: Capillary refill takes less than 2 seconds.      Findings: Rash present.      Comments: + mild erythema, blanching plaques to BL posterior legs, chest/ abdomen.    Neurological:      General: No focal deficit present.      Mental Status: She is alert and oriented to person, place, and time. Mental status is at baseline.      Cranial Nerves: No cranial nerve deficit.      Sensory: No sensory deficit.      Motor: No weakness.   Psychiatric:         Mood and Affect: Mood normal.         Behavior: Behavior normal.             Assessment / Plan / MDM:  Medical Decision Making  Mod/ high susp for contact dermatitis, mild allergic reaction. No signs of anaphylaxis. Doubt drug / viral rash based on clinical history. Do not suspect SJS/ TEN. Pt has already been taking antihistamines. Will trial course of steroids. Advised f/u with PCP, dermatology, ED return for any new/ worsening symptoms.     Risk  Prescription drug management.              I was physically present for the key/critical portions of the following procedures:  Procedures           Randy Keane MD  03/03/24 [id number]

## 2024-03-13 ENCOUNTER — TRANSCRIBE ORDERS (OUTPATIENT)
Dept: REGISTRATION | Facility: CLINIC | Age: 54
End: 2024-03-13

## 2024-03-13 ENCOUNTER — HOSPITAL ENCOUNTER (OUTPATIENT)
Dept: RADIOLOGY | Facility: CLINIC | Age: 54
Discharge: HOME | End: 2024-03-13
Attending: GENERAL ACUTE CARE HOSPITAL
Payer: COMMERCIAL

## 2024-03-13 DIAGNOSIS — Z12.31 ENCOUNTER FOR SCREENING MAMMOGRAM FOR MALIGNANT NEOPLASM OF BREAST: ICD-10-CM

## 2024-03-13 DIAGNOSIS — Z12.31 ENCOUNTER FOR SCREENING MAMMOGRAM FOR MALIGNANT NEOPLASM OF BREAST: Primary | ICD-10-CM

## 2024-03-13 PROCEDURE — 77063 BREAST TOMOSYNTHESIS BI: CPT

## 2024-04-04 ENCOUNTER — TRANSCRIBE ORDERS (OUTPATIENT)
Dept: SCHEDULING | Age: 54
End: 2024-04-04

## 2024-04-04 DIAGNOSIS — N95.8 OTHER SPECIFIED MENOPAUSAL AND PERIMENOPAUSAL DISORDERS: Primary | ICD-10-CM

## 2024-04-24 ENCOUNTER — HOSPITAL ENCOUNTER (OUTPATIENT)
Dept: RADIOLOGY | Age: 54
Discharge: HOME | End: 2024-04-24
Attending: OBSTETRICS & GYNECOLOGY
Payer: COMMERCIAL

## 2024-04-24 DIAGNOSIS — N95.8 OTHER SPECIFIED MENOPAUSAL AND PERIMENOPAUSAL DISORDERS: ICD-10-CM

## 2024-04-24 PROCEDURE — 76856 US EXAM PELVIC COMPLETE: CPT

## 2024-10-03 ENCOUNTER — TELEPHONE (OUTPATIENT)
Dept: PRIMARY CARE | Facility: CLINIC | Age: 54
End: 2024-10-03
Payer: COMMERCIAL

## 2024-10-03 NOTE — TELEPHONE ENCOUNTER
[facility] Appointment Request   Provider: any  Appointment Type: SDS  Reason for Visit: painful cyst at the collarbone x 2d  Available Day and Time: any today or tomorrow  Best Contact Number: [phone]    The practice will reach out to schedule your appointment within the next 2 business days.

## 2024-10-04 ENCOUNTER — TRANSCRIBE ORDERS (OUTPATIENT)
Dept: SCHEDULING | Age: 54
End: 2024-10-04

## 2024-10-04 DIAGNOSIS — R22.1 LOCALIZED SWELLING, MASS AND LUMP, NECK: Primary | ICD-10-CM

## 2024-10-04 NOTE — TELEPHONE ENCOUNTER
Spoke with Karey, she mentioned that she has a lump on her neck that has been there for about 3-4 days. She mentioned that it does hurt to touch and has not increased in size. Patient mentioned that there is no discharge with this. She also mentioned that she did get a flu shot on Monday but unsure if this is associated with this. Advised patient the only appointment that we have available is a 3:15 but we don't think that we will be able to get a ultrasound completed today. Patient mentioned that she is going to go to her clinic at work and will have everything sent over to us for her records.

## 2024-10-07 ENCOUNTER — HOSPITAL ENCOUNTER (OUTPATIENT)
Dept: RADIOLOGY | Age: 54
Discharge: HOME | End: 2024-10-07
Payer: COMMERCIAL

## 2024-10-07 DIAGNOSIS — R22.1 LOCALIZED SWELLING, MASS AND LUMP, NECK: ICD-10-CM

## 2024-10-07 PROCEDURE — 76536 US EXAM OF HEAD AND NECK: CPT

## 2025-02-04 SDOH — ECONOMIC STABILITY: INCOME INSECURITY: IN THE LAST 12 MONTHS, WAS THERE A TIME WHEN YOU WERE NOT ABLE TO PAY THE MORTGAGE OR RENT ON TIME?: NO

## 2025-02-04 SDOH — ECONOMIC STABILITY: FOOD INSECURITY: WITHIN THE PAST 12 MONTHS, YOU WORRIED THAT YOUR FOOD WOULD RUN OUT BEFORE YOU GOT MONEY TO BUY MORE.: NEVER TRUE

## 2025-02-04 SDOH — ECONOMIC STABILITY: FOOD INSECURITY: WITHIN THE PAST 12 MONTHS, THE FOOD YOU BOUGHT JUST DIDN'T LAST AND YOU DIDN'T HAVE MONEY TO GET MORE.: NEVER TRUE

## 2025-02-04 SDOH — ECONOMIC STABILITY: TRANSPORTATION INSECURITY
IN THE PAST 12 MONTHS, HAS THE LACK OF TRANSPORTATION KEPT YOU FROM MEDICAL APPOINTMENTS OR FROM GETTING MEDICATIONS?: NO

## 2025-02-04 SDOH — ECONOMIC STABILITY: TRANSPORTATION INSECURITY
IN THE PAST 12 MONTHS, HAS LACK OF TRANSPORTATION KEPT YOU FROM MEETINGS, WORK, OR FROM GETTING THINGS NEEDED FOR DAILY LIVING?: NO

## 2025-02-04 ASSESSMENT — SOCIAL DETERMINANTS OF HEALTH (SDOH): IN THE PAST 12 MONTHS, HAS THE ELECTRIC, GAS, OIL, OR WATER COMPANY THREATENED TO SHUT OFF SERVICE IN YOUR HOME?: NO

## 2025-02-05 ENCOUNTER — OFFICE VISIT (OUTPATIENT)
Dept: PRIMARY CARE | Facility: CLINIC | Age: 55
End: 2025-02-05
Payer: COMMERCIAL

## 2025-02-05 VITALS
SYSTOLIC BLOOD PRESSURE: 130 MMHG | OXYGEN SATURATION: 97 % | WEIGHT: 169 LBS | RESPIRATION RATE: 17 BRPM | DIASTOLIC BLOOD PRESSURE: 80 MMHG | HEART RATE: 86 BPM | TEMPERATURE: 98.7 F | BODY MASS INDEX: 29.95 KG/M2 | HEIGHT: 63 IN

## 2025-02-05 DIAGNOSIS — Z12.31 ENCOUNTER FOR SCREENING MAMMOGRAM FOR MALIGNANT NEOPLASM OF BREAST: ICD-10-CM

## 2025-02-05 DIAGNOSIS — Z00.00 ROUTINE PHYSICAL EXAMINATION: Primary | ICD-10-CM

## 2025-02-05 DIAGNOSIS — B35.1 TOENAIL FUNGUS: ICD-10-CM

## 2025-02-05 DIAGNOSIS — Z23 NEED FOR TDAP VACCINATION: ICD-10-CM

## 2025-02-05 DIAGNOSIS — R47.89 WORD FINDING DIFFICULTY: ICD-10-CM

## 2025-02-05 DIAGNOSIS — R06.83 SNORING: ICD-10-CM

## 2025-02-05 PROBLEM — R23.9 SKIN CHANGE: Status: RESOLVED | Noted: 2023-12-22 | Resolved: 2025-02-05

## 2025-02-05 PROBLEM — I65.23 BILATERAL CAROTID ARTERY STENOSIS: Status: RESOLVED | Noted: 2023-05-06 | Resolved: 2025-02-05

## 2025-02-05 PROBLEM — B97.7 HPV (HUMAN PAPILLOMA VIRUS) INFECTION: Status: ACTIVE | Noted: 2024-03-22

## 2025-02-05 PROBLEM — J06.9 VIRAL URI: Status: RESOLVED | Noted: 2023-12-22 | Resolved: 2025-02-05

## 2025-02-05 PROBLEM — R00.2 PALPITATIONS: Status: RESOLVED | Noted: 2023-05-05 | Resolved: 2025-02-05

## 2025-02-05 PROCEDURE — 90715 TDAP VACCINE 7 YRS/> IM: CPT | Performed by: FAMILY MEDICINE

## 2025-02-05 PROCEDURE — 3008F BODY MASS INDEX DOCD: CPT | Performed by: FAMILY MEDICINE

## 2025-02-05 PROCEDURE — 90471 IMMUNIZATION ADMIN: CPT | Performed by: FAMILY MEDICINE

## 2025-02-05 PROCEDURE — 99396 PREV VISIT EST AGE 40-64: CPT | Mod: 25 | Performed by: FAMILY MEDICINE

## 2025-02-05 ASSESSMENT — ENCOUNTER SYMPTOMS
FEVER: 0
ABDOMINAL PAIN: 0
CHILLS: 0
NERVOUS/ANXIOUS: 0
COUGH: 0
FATIGUE: 0
FREQUENCY: 0
WHEEZING: 0
MYALGIAS: 0
SLEEP DISTURBANCE: 0
SHORTNESS OF BREATH: 0
DIZZINESS: 0
BLOOD IN STOOL: 0
PALPITATIONS: 0
SORE THROAT: 0
HEMATURIA: 0
RHINORRHEA: 0
CONSTIPATION: 0
DYSURIA: 0
HEADACHES: 0
ARTHRALGIAS: 0

## 2025-02-05 ASSESSMENT — PATIENT HEALTH QUESTIONNAIRE - PHQ9: SUM OF ALL RESPONSES TO PHQ9 QUESTIONS 1 & 2: 0

## 2025-02-05 NOTE — PROGRESS NOTES
Subjective      Patient ID: Karey Kaur is a 54 y.o. female.    She is here for physical. Pt has been doing pretty good overall. She is trying to follow well balanced meals. Stays well hydrated. She is active day to day life but no formal exercise but planning to go back [employer] and just started to wall pilates. Average 6hrs of sleep a night but does struggle with deep study and does sometimes snore. Normal BMS and urination. UTD with colonoscopy. UTD with eye and dental exam. UTD with gyn care. She did get abnormal pap smear last yr and had colposcopy and going back for recheck this spring. Last period was Dec 2023. She did have episode of two times having bowel movement and then had vaginal bleeding before seeing gyn last yr. Due for fasting labs. Due for mammo next month. Due for Tdap and shingrex. UTD with derm and going back in the spring. She does lately noticing word recall- words that she should know over past month.         The following have been reviewed and updated as appropriate in this visit:   Tobacco  Allergies  Meds  Problems  Fam Hx       Review of Systems   Constitutional:  Negative for chills, fatigue and fever.   HENT:  Negative for ear discharge, ear pain, postnasal drip, rhinorrhea and sore throat.    Respiratory:  Negative for cough, shortness of breath and wheezing.    Cardiovascular:  Negative for chest pain and palpitations.   Gastrointestinal:  Negative for abdominal pain, blood in stool and constipation.   Genitourinary:  Negative for dysuria, frequency and hematuria.   Musculoskeletal:  Negative for arthralgias and myalgias.   Skin:  Negative for rash.        R foot great toenail and pinkie toenail fungus   Neurological:  Negative for dizziness and headaches.        +word recall difficulty   Psychiatric/Behavioral:  Negative for sleep disturbance and suicidal ideas. The patient is not nervous/anxious.        Current Outpatient Medications   Medication Sig Dispense  Refill    ascorbic acid/zinc (ZINC SULFATE-VITAMIN C ORAL) Take 1 tablet by mouth daily.      cetirizine (ZyrTEC) 10 mg tablet Take 10 mg by mouth as needed.      cranberry fruit concentrate (AZO CRANBERRY ORAL) Take by mouth.      fluticasone (FLONASE SENSIMIST) 27.5 mcg/actuation nasal spray as needed for allergies.      HAIR, SKIN AND NAILS, BIOTIN, ORAL       MAGNESIUM ORAL Take 125 mg by mouth.      multivitamin with minerals (ONE DAILY 50 PLUS ORAL)        No current facility-administered medications for this visit.     Past Medical History:   Diagnosis Date    Abnormal vaginal Pap smear 2024    s/p colposcopy    Allergic     GERD (gastroesophageal reflux disease)      Family History   Problem Relation Name Age of Onset    Diabetes Biological Mother      Hypertension Biological Mother      Hyperlipidemia Biological Father      Hypertension Biological Father      Breast cancer Maternal Grandmother      Aneurysm Mother's Brother      Hypertension Half-Brother       Past Surgical History   Procedure Laterality Date    Ankle surgery       section      Cholecystectomy      Colposcopy  2024    abnormal HPV+    Lasik      Lithotripsy      Myringotomy w/ tubes      Tonsillectomy      Tubal ligation       Social History     Socioeconomic History    Marital status:      Spouse name: Not on file    Number of children: Not on file    Years of education: Not on file    Highest education level: Not on file   Occupational History    Not on file   Tobacco Use    Smoking status: Never    Smokeless tobacco: Never   Vaping Use    Vaping status: Never Used   Substance and Sexual Activity    Alcohol use: Yes     Comment: Social    Drug use: No    Sexual activity: Never   Other Topics Concern    Not on file   Social History Narrative    Not on file     Social Drivers of Health     Financial Resource Strain: Not on file   Food Insecurity: No Food Insecurity (2025)    Hunger Vital Sign     Worried About  "Running Out of Food in the Last Year: Never true     Ran Out of Food in the Last Year: Never true   Transportation Needs: No Transportation Needs (2/4/2025)    PRAPARE - Transportation     Lack of Transportation (Medical): No     Lack of Transportation (Non-Medical): No   Physical Activity: Not on file   Stress: Not on file   Social Connections: Not on file   Intimate Partner Violence: Not on file   Housing Stability: Low Risk  (2/4/2025)    Housing Stability Vital Sign     Unable to Pay for Housing in the Last Year: No     Number of Times Moved in the Last Year: 0     Homeless in the Last Year: No     Allergies   Allergen Reactions    Nut - Unspecified GI intolerance    Cinnamon      Tested positive on allergy testing     Doxycycline Other (see comments)     Dizziness and nausea.    Erythromycin GI intolerance    Lavender (Lavandula Angustifolia)      Headache, itching  & tested on positive on allergy testing     Levofloxacin Other (see comments)     Joint Pain & difficulty walking     Meperidine Hcl Itching    Adhesive Rash    Cephalosporins Rash    Latex Rash    Metronidazole Rash    Penicillins Rash    Sulfa (Sulfonamide Antibiotics) Rash       Objective   Visit Vitals  /80 (BP Location: Left upper arm, Patient Position: Sitting)   Pulse 86   Temp 37.1 °C (98.7 °F) (Temporal)   Resp 17   Ht 1.6 m (5' 3\")   Wt 76.7 kg (169 lb)   LMP  (LMP Unknown)   SpO2 97%   BMI 29.94 kg/m²     Physical Exam  Vitals and nursing note reviewed.   Constitutional:       Appearance: Normal appearance.   HENT:      Right Ear: Tympanic membrane normal. There is no impacted cerumen.      Left Ear: Tympanic membrane normal. There is no impacted cerumen.      Nose: Nose normal. No rhinorrhea.      Mouth/Throat:      Mouth: Mucous membranes are moist.      Pharynx: Oropharynx is clear. No posterior oropharyngeal erythema.   Cardiovascular:      Rate and Rhythm: Normal rate and regular rhythm.      Heart sounds: No murmur " heard.  Pulmonary:      Effort: Pulmonary effort is normal.      Breath sounds: Normal breath sounds. No wheezing.   Abdominal:      General: Bowel sounds are normal.      Palpations: Abdomen is soft.      Tenderness: There is no abdominal tenderness.   Musculoskeletal:         General: Normal range of motion.      Cervical back: Normal range of motion and neck supple.   Skin:     General: Skin is warm.      Findings: No rash.      Comments: R foot- great toenail and pinkie toenail with fungus   Neurological:      General: No focal deficit present.      Mental Status: She is alert and oriented to person, place, and time.      Cranial Nerves: No cranial nerve deficit.   Psychiatric:         Mood and Affect: Mood normal.         Behavior: Behavior normal.         Assessment/Plan   Problem List Items Addressed This Visit    None  Visit Diagnoses       Routine physical examination    -  Primary    Relevant Orders    Comprehensive metabolic panel    TSH    CBC    Lipid panel    Hemoglobin A1c    Need for Tdap vaccination        Relevant Orders    Tdap vaccine greater than or equal to 8yo IM (Completed)    Toenail fungus        Snoring        Relevant Orders    Home sleep test    Word finding difficulty        Relevant Orders    Vitamin B12    RPR    Encounter for screening mammogram for malignant neoplasm of breast        Relevant Orders    BI SCREENING MAMMOGRAM BILATERAL(TOMOSYNTHESIS)        She is doing well overall. We discussed diet and exercise. Will check updated labs. She will do mammo next month. She will see gyn soon for updated pap smear. UTD with eye and dental exam. UTD with derm. UTD with colonoscopy. UTD with dexa scan. Will do Tdap now and shingrex down the line. Will treat the toenail fungus with nail polish first and when she sees derm in the spring she will show them the toenails. Also will check a few labs for word recall and she will continue to monitor and any changes or concerns will let me know.      Kenia Dumont, DO  2/5/2025

## 2025-02-06 ENCOUNTER — TELEPHONE (OUTPATIENT)
Dept: SLEEP MEDICINE | Facility: HOSPITAL | Age: 55
End: 2025-02-06
Payer: COMMERCIAL

## 2025-02-06 NOTE — TELEPHONE ENCOUNTER
Reached pt but she was unable to schedule a time to  home sleep equipment. She will call us back to schedule.    [facility]  [phone]

## 2025-02-11 ENCOUNTER — TELEPHONE (OUTPATIENT)
Dept: PRIMARY CARE | Facility: CLINIC | Age: 55
End: 2025-02-11
Payer: COMMERCIAL

## 2025-02-11 NOTE — TELEPHONE ENCOUNTER
Pt called to inquire about an Rx that was going to be sent in during last OV and pharmacy has not received Rx per pt.

## 2025-03-02 ENCOUNTER — TELEPHONE (OUTPATIENT)
Dept: PRIMARY CARE | Facility: CLINIC | Age: 55
End: 2025-03-02
Payer: COMMERCIAL

## 2025-03-03 NOTE — TELEPHONE ENCOUNTER
Please check in with her b/c she went to ER for chest pain and wanted to see how she is feeling and looks they recommended her to see cardio if she planning to do that

## 2025-03-26 ENCOUNTER — RESULTS FOLLOW-UP (OUTPATIENT)
Dept: PRIMARY CARE | Facility: CLINIC | Age: 55
End: 2025-03-26

## 2025-03-26 LAB
ALBUMIN SERPL-MCNC: 4.5 G/DL (ref 3.8–4.9)
ALP SERPL-CCNC: 64 IU/L (ref 44–121)
ALT SERPL-CCNC: 16 IU/L (ref 0–32)
AST SERPL-CCNC: 28 IU/L (ref 0–40)
BILIRUB SERPL-MCNC: 0.4 MG/DL (ref 0–1.2)
BUN SERPL-MCNC: 11 MG/DL (ref 6–24)
BUN/CREAT SERPL: 16 (ref 9–23)
CALCIUM SERPL-MCNC: 9.4 MG/DL (ref 8.7–10.2)
CHLORIDE SERPL-SCNC: 102 MMOL/L (ref 96–106)
CHOLEST SERPL-MCNC: 199 MG/DL (ref 100–199)
CO2 SERPL-SCNC: 24 MMOL/L (ref 20–29)
CREAT SERPL-MCNC: 0.69 MG/DL (ref 0.57–1)
EGFRCR SERPLBLD CKD-EPI 2021: 103 ML/MIN/1.73
ERYTHROCYTE [DISTWIDTH] IN BLOOD BY AUTOMATED COUNT: 12.4 % (ref 11.7–15.4)
GLOBULIN SER CALC-MCNC: 2 G/DL (ref 1.5–4.5)
GLUCOSE SERPL-MCNC: 78 MG/DL (ref 70–99)
HBA1C MFR BLD: 5.5 % (ref 4.8–5.6)
HCT VFR BLD AUTO: 41.1 % (ref 34–46.6)
HDLC SERPL-MCNC: 88 MG/DL
HGB BLD-MCNC: 13.5 G/DL (ref 11.1–15.9)
LDLC SERPL CALC-MCNC: 103 MG/DL (ref 0–99)
MCH RBC QN AUTO: 30.5 PG (ref 26.6–33)
MCHC RBC AUTO-ENTMCNC: 32.8 G/DL (ref 31.5–35.7)
MCV RBC AUTO: 93 FL (ref 79–97)
PLATELET # BLD AUTO: 333 X10E3/UL (ref 150–450)
POTASSIUM SERPL-SCNC: 4.4 MMOL/L (ref 3.5–5.2)
PROT SERPL-MCNC: 6.5 G/DL (ref 6–8.5)
RBC # BLD AUTO: 4.42 X10E6/UL (ref 3.77–5.28)
RPR SER QL: NON REACTIVE
SODIUM SERPL-SCNC: 141 MMOL/L (ref 134–144)
TRIGL SERPL-MCNC: 44 MG/DL (ref 0–149)
TSH SERPL DL<=0.005 MIU/L-ACNC: 2.17 UIU/ML (ref 0.45–4.5)
VIT B12 SERPL-MCNC: 729 PG/ML (ref 232–1245)
VLDLC SERPL CALC-MCNC: 8 MG/DL (ref 5–40)
WBC # BLD AUTO: 6.5 X10E3/UL (ref 3.4–10.8)

## 2025-04-23 ENCOUNTER — HOSPITAL ENCOUNTER (OUTPATIENT)
Dept: RADIOLOGY | Facility: CLINIC | Age: 55
Discharge: HOME | End: 2025-04-23
Attending: FAMILY MEDICINE
Payer: COMMERCIAL

## 2025-04-23 DIAGNOSIS — Z12.31 ENCOUNTER FOR SCREENING MAMMOGRAM FOR MALIGNANT NEOPLASM OF BREAST: ICD-10-CM

## 2025-04-28 ENCOUNTER — HOSPITAL ENCOUNTER (OUTPATIENT)
Dept: SLEEP MEDICINE | Facility: HOSPITAL | Age: 55
Discharge: HOME | End: 2025-04-28
Attending: FAMILY MEDICINE
Payer: COMMERCIAL

## 2025-04-28 DIAGNOSIS — R06.83 SNORING: ICD-10-CM

## 2025-04-28 PROCEDURE — G0399 HOME SLEEP TEST/TYPE 3 PORTA: HCPCS

## 2025-05-06 VITALS — WEIGHT: 169 LBS | HEIGHT: 63 IN | BODY MASS INDEX: 29.95 KG/M2

## 2025-05-13 ENCOUNTER — TRANSCRIBE ORDERS (OUTPATIENT)
Dept: SCHEDULING | Age: 55
End: 2025-05-13

## 2025-05-13 DIAGNOSIS — N64.4 MASTODYNIA: Primary | ICD-10-CM

## 2025-05-20 ENCOUNTER — HOSPITAL ENCOUNTER (OUTPATIENT)
Dept: RADIOLOGY | Age: 55
Discharge: HOME | End: 2025-05-20
Attending: OBSTETRICS & GYNECOLOGY
Payer: COMMERCIAL

## 2025-05-20 DIAGNOSIS — N64.4 MASTODYNIA: ICD-10-CM

## 2025-05-20 PROCEDURE — 77066 DX MAMMO INCL CAD BI: CPT

## 2025-05-20 PROCEDURE — 76642 ULTRASOUND BREAST LIMITED: CPT | Mod: LT

## 2025-08-07 ENCOUNTER — HOSPITAL ENCOUNTER (EMERGENCY)
Facility: HOSPITAL | Age: 55
Discharge: HOME | End: 2025-08-07
Attending: EMERGENCY MEDICINE
Payer: COMMERCIAL

## 2025-08-12 ENCOUNTER — TELEPHONE (OUTPATIENT)
Dept: SCHEDULING | Facility: CLINIC | Age: 55
End: 2025-08-12
Payer: COMMERCIAL

## 2025-09-02 ENCOUNTER — OFFICE VISIT (OUTPATIENT)
Dept: CARDIOLOGY | Facility: CLINIC | Age: 55
End: 2025-09-02
Payer: COMMERCIAL

## 2025-09-02 VITALS
SYSTOLIC BLOOD PRESSURE: 126 MMHG | OXYGEN SATURATION: 96 % | WEIGHT: 169 LBS | DIASTOLIC BLOOD PRESSURE: 80 MMHG | RESPIRATION RATE: 16 BRPM | HEIGHT: 63 IN | BODY MASS INDEX: 29.95 KG/M2 | HEART RATE: 65 BPM

## 2025-09-02 DIAGNOSIS — R00.9 ABNORMAL HEART RATE: Primary | ICD-10-CM

## 2025-09-02 LAB
ATRIAL RATE: 66
P AXIS: 25
PR INTERVAL: 132
QRS DURATION: 94
QT INTERVAL: 412
QTC CALCULATION(BAZETT): 431
R AXIS: 25
T WAVE AXIS: 20
VENTRICULAR RATE: 66

## 2025-09-02 PROCEDURE — 3008F BODY MASS INDEX DOCD: CPT | Performed by: INTERNAL MEDICINE

## 2025-09-02 PROCEDURE — 93000 ELECTROCARDIOGRAM COMPLETE: CPT | Performed by: INTERNAL MEDICINE

## 2025-09-02 PROCEDURE — 99214 OFFICE O/P EST MOD 30 MIN: CPT | Performed by: INTERNAL MEDICINE
